# Patient Record
Sex: FEMALE | Race: WHITE | Employment: FULL TIME | URBAN - METROPOLITAN AREA
[De-identification: names, ages, dates, MRNs, and addresses within clinical notes are randomized per-mention and may not be internally consistent; named-entity substitution may affect disease eponyms.]

---

## 2017-04-15 ENCOUNTER — HOSPITAL ENCOUNTER (EMERGENCY)
Age: 62
Discharge: HOME | End: 2017-04-15
Attending: EMERGENCY MEDICINE
Payer: SELF-PAY

## 2017-04-15 ENCOUNTER — APPOINTMENT (OUTPATIENT)
Dept: GENERAL RADIOLOGY | Age: 62
End: 2017-04-15
Attending: EMERGENCY MEDICINE
Payer: SELF-PAY

## 2017-04-15 VITALS
HEART RATE: 102 BPM | WEIGHT: 178 LBS | BODY MASS INDEX: 29.66 KG/M2 | HEIGHT: 65 IN | SYSTOLIC BLOOD PRESSURE: 106 MMHG | OXYGEN SATURATION: 95 % | RESPIRATION RATE: 18 BRPM | DIASTOLIC BLOOD PRESSURE: 56 MMHG | TEMPERATURE: 99.1 F

## 2017-04-15 DIAGNOSIS — J20.9 ACUTE BRONCHITIS, UNSPECIFIED ORGANISM: Primary | ICD-10-CM

## 2017-04-15 LAB
ALBUMIN SERPL BCP-MCNC: 3.9 G/DL (ref 3.2–4.6)
ALBUMIN/GLOB SERPL: 0.9 {RATIO} (ref 1.2–3.5)
ALP SERPL-CCNC: 99 U/L (ref 50–136)
ALT SERPL-CCNC: 49 U/L (ref 12–65)
ANION GAP BLD CALC-SCNC: 9 MMOL/L (ref 7–16)
AST SERPL W P-5'-P-CCNC: 38 U/L (ref 15–37)
ATRIAL RATE: 106 BPM
BASOPHILS # BLD AUTO: 0.1 K/UL (ref 0–0.2)
BASOPHILS # BLD: 1 % (ref 0–2)
BILIRUB SERPL-MCNC: 0.3 MG/DL (ref 0.2–1.1)
BUN SERPL-MCNC: 14 MG/DL (ref 8–23)
CALCIUM SERPL-MCNC: 9.1 MG/DL (ref 8.3–10.4)
CALCULATED P AXIS, ECG09: 64 DEGREES
CALCULATED R AXIS, ECG10: 66 DEGREES
CALCULATED T AXIS, ECG11: 28 DEGREES
CHLORIDE SERPL-SCNC: 106 MMOL/L (ref 98–107)
CO2 SERPL-SCNC: 26 MMOL/L (ref 21–32)
CREAT SERPL-MCNC: 1.2 MG/DL (ref 0.6–1)
DIAGNOSIS, 93000: NORMAL
DIFFERENTIAL METHOD BLD: NORMAL
EOSINOPHIL # BLD: 0.3 K/UL (ref 0–0.8)
EOSINOPHIL NFR BLD: 5 % (ref 0.5–7.8)
ERYTHROCYTE [DISTWIDTH] IN BLOOD BY AUTOMATED COUNT: 13.7 % (ref 11.9–14.6)
GLOBULIN SER CALC-MCNC: 4.3 G/DL (ref 2.3–3.5)
GLUCOSE SERPL-MCNC: 126 MG/DL (ref 65–100)
HCT VFR BLD AUTO: 43.4 % (ref 35.8–46.3)
HGB BLD-MCNC: 15.2 G/DL (ref 11.7–15.4)
IMM GRANULOCYTES # BLD: 0 K/UL (ref 0–0.5)
IMM GRANULOCYTES NFR BLD AUTO: 0.2 % (ref 0–5)
LYMPHOCYTES # BLD AUTO: 36 % (ref 13–44)
LYMPHOCYTES # BLD: 2 K/UL (ref 0.5–4.6)
MCH RBC QN AUTO: 32.5 PG (ref 26.1–32.9)
MCHC RBC AUTO-ENTMCNC: 35 G/DL (ref 31.4–35)
MCV RBC AUTO: 92.9 FL (ref 79.6–97.8)
MONOCYTES # BLD: 0.4 K/UL (ref 0.1–1.3)
MONOCYTES NFR BLD AUTO: 8 % (ref 4–12)
NEUTS SEG # BLD: 2.7 K/UL (ref 1.7–8.2)
NEUTS SEG NFR BLD AUTO: 50 % (ref 43–78)
P-R INTERVAL, ECG05: 188 MS
PLATELET # BLD AUTO: 233 K/UL (ref 150–450)
PMV BLD AUTO: 10.8 FL (ref 10.8–14.1)
POTASSIUM SERPL-SCNC: 3.7 MMOL/L (ref 3.5–5.1)
PROT SERPL-MCNC: 8.2 G/DL (ref 6.3–8.2)
Q-T INTERVAL, ECG07: 304 MS
QRS DURATION, ECG06: 68 MS
QTC CALCULATION (BEZET), ECG08: 403 MS
RBC # BLD AUTO: 4.67 M/UL (ref 4.05–5.25)
SODIUM SERPL-SCNC: 141 MMOL/L (ref 136–145)
TROPONIN I SERPL-MCNC: <0.02 NG/ML (ref 0.02–0.05)
VENTRICULAR RATE, ECG03: 106 BPM
WBC # BLD AUTO: 5.5 K/UL (ref 4.3–11.1)

## 2017-04-15 PROCEDURE — 93005 ELECTROCARDIOGRAM TRACING: CPT | Performed by: EMERGENCY MEDICINE

## 2017-04-15 PROCEDURE — 85025 COMPLETE CBC W/AUTO DIFF WBC: CPT | Performed by: EMERGENCY MEDICINE

## 2017-04-15 PROCEDURE — 74011000250 HC RX REV CODE- 250: Performed by: EMERGENCY MEDICINE

## 2017-04-15 PROCEDURE — 80053 COMPREHEN METABOLIC PANEL: CPT | Performed by: EMERGENCY MEDICINE

## 2017-04-15 PROCEDURE — 99284 EMERGENCY DEPT VISIT MOD MDM: CPT | Performed by: EMERGENCY MEDICINE

## 2017-04-15 PROCEDURE — 84484 ASSAY OF TROPONIN QUANT: CPT | Performed by: EMERGENCY MEDICINE

## 2017-04-15 PROCEDURE — 94640 AIRWAY INHALATION TREATMENT: CPT

## 2017-04-15 PROCEDURE — 71020 XR CHEST PA LAT: CPT

## 2017-04-15 RX ORDER — AZITHROMYCIN 250 MG/1
TABLET, FILM COATED ORAL
Qty: 6 TAB | Refills: 0 | Status: SHIPPED | OUTPATIENT
Start: 2017-04-15 | End: 2017-05-01

## 2017-04-15 RX ORDER — ALBUTEROL SULFATE 90 UG/1
2 AEROSOL, METERED RESPIRATORY (INHALATION)
Qty: 1 INHALER | Refills: 0 | Status: SHIPPED | OUTPATIENT
Start: 2017-04-15 | End: 2017-06-01

## 2017-04-15 RX ORDER — BENZONATATE 200 MG/1
200 CAPSULE ORAL
Qty: 21 CAP | Refills: 0 | Status: SHIPPED | OUTPATIENT
Start: 2017-04-15 | End: 2017-04-22

## 2017-04-15 RX ORDER — IPRATROPIUM BROMIDE AND ALBUTEROL SULFATE 2.5; .5 MG/3ML; MG/3ML
3 SOLUTION RESPIRATORY (INHALATION)
Status: COMPLETED | OUTPATIENT
Start: 2017-04-15 | End: 2017-04-15

## 2017-04-15 RX ADMIN — IPRATROPIUM BROMIDE AND ALBUTEROL SULFATE 3 ML: 2.5; .5 SOLUTION RESPIRATORY (INHALATION) at 04:22

## 2017-04-15 NOTE — DISCHARGE INSTRUCTIONS
Use inhlaer 2 puffs every 6 hours  1,200mg mucinex DM every 12 hours for a week. Drink plenty of fluids           Bronchitis: Care Instructions  Your Care Instructions    Bronchitis is inflammation of the bronchial tubes, which carry air to the lungs. The tubes swell and produce mucus, or phlegm. The mucus and inflamed bronchial tubes make you cough. You may have trouble breathing. Most cases of bronchitis are caused by viruses like those that cause colds. Antibiotics usually do not help and they may be harmful. Bronchitis usually develops rapidly and lasts about 2 to 3 weeks in otherwise healthy people. Follow-up care is a key part of your treatment and safety. Be sure to make and go to all appointments, and call your doctor if you are having problems. It's also a good idea to know your test results and keep a list of the medicines you take. How can you care for yourself at home? · Take all medicines exactly as prescribed. Call your doctor if you think you are having a problem with your medicine. · Get some extra rest.  · Take an over-the-counter pain medicine, such as acetaminophen (Tylenol), ibuprofen (Advil, Motrin), or naproxen (Aleve) to reduce fever and relieve body aches. Read and follow all instructions on the label. · Do not take two or more pain medicines at the same time unless the doctor told you to. Many pain medicines have acetaminophen, which is Tylenol. Too much acetaminophen (Tylenol) can be harmful. · Take an over-the-counter cough medicine that contains dextromethorphan to help quiet a dry, hacking cough so that you can sleep. Avoid cough medicines that have more than one active ingredient. Read and follow all instructions on the label. · Breathe moist air from a humidifier, hot shower, or sink filled with hot water. The heat and moisture will thin mucus so you can cough it out. · Do not smoke. Smoking can make bronchitis worse.  If you need help quitting, talk to your doctor about stop-smoking programs and medicines. These can increase your chances of quitting for good. When should you call for help? Call 911 anytime you think you may need emergency care. For example, call if:  · You have severe trouble breathing. Call your doctor now or seek immediate medical care if:  · You have new or worse trouble breathing. · You cough up dark brown or bloody mucus (sputum). · You have a new or higher fever. · You have a new rash. Watch closely for changes in your health, and be sure to contact your doctor if:  · You cough more deeply or more often, especially if you notice more mucus or a change in the color of your mucus. · You are not getting better as expected. Where can you learn more? Go to http://maribeth-juan.info/. Enter H333 in the search box to learn more about \"Bronchitis: Care Instructions. \"  Current as of: May 23, 2016  Content Version: 11.2  © 8727-7877 [employer]. Care instructions adapted under license by [employer] (which disclaims liability or warranty for this information). If you have questions about a medical condition or this instruction, always ask your healthcare professional. [address] any warranty or liability for your use of this information.

## 2017-04-15 NOTE — ED PROVIDER NOTES
HPI Comments: Condition presents with 3 days of upper respiratory infection symptoms. A coworker who sits next to hers been coughing for about 2 weeks. Patient now presents with wheezing and coughing productive of green phlegm. The highest temperature she has encountered is 99.7 orally at home. The patient denies generalized body aches and pains. Patient is a 65 y.o. female presenting with shortness of breath. The history is provided by the patient. Shortness of Breath   This is a new problem. The current episode started more than 2 days ago. The problem has not changed since onset. Associated symptoms include cough and wheezing. Pertinent negatives include no fever, no headaches, no rhinorrhea, no sore throat, no chest pain, no vomiting, no abdominal pain and no rash. Past Medical History:   Diagnosis Date    Anxiety     ASHD (arteriosclerotic heart disease)     CT Calcium = 44    GERD (gastroesophageal reflux disease)     egd , DR Jason Quarles    HLD (hyperlipidemia)     ascvd = 3.8 in 2016    Menopause     Prediabetes     Sleep apnea     on cpap       Past Surgical History:   Procedure Laterality Date    HX ADENOIDECTOMY      HX  SECTION      HX TONSILLECTOMY           Family History:   Problem Relation Age of Onset    Hypertension Father        Social History     Social History    Marital status:      Spouse name: N/A    Number of children: N/A    Years of education: N/A     Occupational History    Not on file. Social History Main Topics    Smoking status: Former Smoker    Smokeless tobacco: Never Used    Alcohol use 0.0 oz/week     0 Standard drinks or equivalent per week    Drug use: Not on file    Sexual activity: Not on file     Other Topics Concern    Sleep Concern Yes     Social History Narrative         ALLERGIES: Sulfa dyne    Review of Systems   Constitutional: Negative for chills, fatigue and fever. HENT: Negative for rhinorrhea and sore throat. Eyes: Negative for discharge and redness. Respiratory: Positive for cough, shortness of breath and wheezing. Cardiovascular: Negative for chest pain. Gastrointestinal: Negative for abdominal pain, nausea and vomiting. Musculoskeletal: Negative for arthralgias, back pain and myalgias. Skin: Negative for rash. Neurological: Negative for dizziness and headaches. All other systems reviewed and are negative. Vitals:    04/15/17 0322 04/15/17 0423   BP: 141/80    Pulse: (!) 114    Resp: 22    Temp: 99.1 °F (37.3 °C)    SpO2: 95% 95%   Weight: 80.7 kg (178 lb)    Height: 5' 5\" (1.651 m)             Physical Exam   Constitutional: She is oriented to person, place, and time. She appears well-developed and well-nourished. No distress. HENT:   Head: Normocephalic and atraumatic. Mouth/Throat: Oropharynx is clear and moist. No oropharyngeal exudate. Eyes: Conjunctivae and EOM are normal. Pupils are equal, round, and reactive to light. Right eye exhibits no discharge. Left eye exhibits no discharge. No scleral icterus. Neck: Normal range of motion. Neck supple. Cardiovascular: Normal rate, regular rhythm and normal heart sounds. Exam reveals no gallop. No murmur heard. Pulmonary/Chest: Effort normal. No respiratory distress. She has decreased breath sounds in the right upper field, the right middle field and the right lower field. She has wheezes in the left upper field, the left middle field and the left lower field. She has no rales. Ventilation better, wheezing his vanished, following a single DuoNeb nebulizer treatment   Musculoskeletal: Normal range of motion. She exhibits no edema. Neurological: She is alert and oriented to person, place, and time. She exhibits normal muscle tone. cni 2-12 grossly   Skin: Skin is warm and dry. She is not diaphoretic. Psychiatric: She has a normal mood and affect. Her behavior is normal.   Nursing note and vitals reviewed.        MDM  Number of Diagnoses or Management Options  Acute bronchitis, unspecified organism:   Diagnosis management comments: Medical decision making note:  Cough with wheezing, transfer 94-95 on room air  Chest x-ray is negative,  Better after treatment,  Treat for bronchitis    This concludes the \"medical decision making note\" part of this emergency department visit note.       ED Course       Procedures

## 2017-04-15 NOTE — ED TRIAGE NOTES
Patient states cough, fever, shortness of breath. States has had a cold for about 3 days with a productive cough. States fever last night with increasing shortness of breath today.

## 2017-05-24 ENCOUNTER — HOSPITAL ENCOUNTER (OUTPATIENT)
Dept: CT IMAGING | Age: 62
Discharge: HOME | End: 2017-05-24
Attending: FAMILY MEDICINE
Payer: COMMERCIAL

## 2017-05-24 DIAGNOSIS — R42 DIZZINESS: ICD-10-CM

## 2017-05-24 PROCEDURE — 70450 CT HEAD/BRAIN W/O DYE: CPT

## 2018-01-11 PROBLEM — E55.9 VITAMIN D DEFICIENCY: Status: ACTIVE | Noted: 2018-01-11

## 2018-04-19 PROBLEM — R07.9 CHEST PAIN AT REST: Status: ACTIVE | Noted: 2018-04-19

## 2018-04-19 PROBLEM — I10 ESSENTIAL HYPERTENSION WITH GOAL BLOOD PRESSURE LESS THAN 130/85: Status: ACTIVE | Noted: 2018-04-19

## 2018-04-23 ENCOUNTER — HOSPITAL ENCOUNTER (OUTPATIENT)
Dept: NON INVASIVE DIAGNOSTICS | Age: 63
Discharge: HOME | End: 2018-04-23
Attending: INTERNAL MEDICINE
Payer: COMMERCIAL

## 2018-04-23 DIAGNOSIS — R07.9 CHEST PAIN AT REST: ICD-10-CM

## 2018-04-23 PROCEDURE — 93306 TTE W/DOPPLER COMPLETE: CPT

## 2018-04-23 PROCEDURE — 74011000250 HC RX REV CODE- 250: Performed by: INTERNAL MEDICINE

## 2018-04-23 PROCEDURE — C8930 TTE W OR W/O CONTR, CONT ECG: HCPCS

## 2018-04-23 PROCEDURE — 74011250636 HC RX REV CODE- 250/636: Performed by: INTERNAL MEDICINE

## 2018-04-23 RX ADMIN — PERFLUTREN 1 ML: 6.52 INJECTION, SUSPENSION INTRAVENOUS at 14:38

## 2018-05-10 PROBLEM — K21.9 GASTROESOPHAGEAL REFLUX DISEASE: Status: ACTIVE | Noted: 2018-05-10

## 2018-10-08 ENCOUNTER — HOSPITAL ENCOUNTER (OUTPATIENT)
Dept: GENERAL RADIOLOGY | Age: 63
Discharge: HOME | End: 2018-10-08
Payer: COMMERCIAL

## 2018-10-08 DIAGNOSIS — R07.89 OTHER CHEST PAIN: ICD-10-CM

## 2018-10-08 PROCEDURE — 71046 X-RAY EXAM CHEST 2 VIEWS: CPT

## 2018-10-09 ENCOUNTER — HOSPITAL ENCOUNTER (OUTPATIENT)
Dept: LAB | Age: 63
Discharge: HOME | End: 2018-10-09

## 2018-10-09 PROCEDURE — 88312 SPECIAL STAINS GROUP 1: CPT

## 2018-10-09 PROCEDURE — 88305 TISSUE EXAM BY PATHOLOGIST: CPT

## 2018-10-11 PROBLEM — G47.00 PERSISTENT DISORDER OF INITIATING OR MAINTAINING SLEEP: Status: ACTIVE | Noted: 2018-10-11

## 2019-01-08 ENCOUNTER — HOSPITAL ENCOUNTER (OUTPATIENT)
Dept: GENERAL RADIOLOGY | Age: 64
Discharge: HOME | End: 2019-01-08
Payer: COMMERCIAL

## 2019-01-08 DIAGNOSIS — M25.572 ACUTE LEFT ANKLE PAIN: ICD-10-CM

## 2019-01-08 PROCEDURE — 73610 X-RAY EXAM OF ANKLE: CPT

## 2019-03-22 PROBLEM — R35.0 URINARY FREQUENCY: Status: ACTIVE | Noted: 2019-03-22

## 2019-03-22 PROBLEM — N30.10 INTERSTITIAL CYSTITIS: Status: ACTIVE | Noted: 2019-03-22

## 2019-03-22 PROBLEM — R31.29 MICROSCOPIC HEMATURIA: Status: ACTIVE | Noted: 2019-03-22

## 2019-04-02 ENCOUNTER — HOSPITAL ENCOUNTER (OUTPATIENT)
Dept: ULTRASOUND IMAGING | Age: 64
Discharge: HOME | End: 2019-04-02
Attending: NURSE PRACTITIONER
Payer: COMMERCIAL

## 2019-04-02 DIAGNOSIS — R10.9 FLANK PAIN: ICD-10-CM

## 2019-04-02 DIAGNOSIS — R31.29 MICROSCOPIC HEMATURIA: ICD-10-CM

## 2019-04-02 PROCEDURE — 76770 US EXAM ABDO BACK WALL COMP: CPT

## 2019-05-15 ENCOUNTER — HOSPITAL ENCOUNTER (OUTPATIENT)
Dept: LAB | Age: 64
Discharge: HOME | End: 2019-05-15

## 2019-05-15 PROCEDURE — 88305 TISSUE EXAM BY PATHOLOGIST: CPT

## 2019-11-27 ENCOUNTER — APPOINTMENT (OUTPATIENT)
Dept: GENERAL RADIOLOGY | Age: 64
End: 2019-11-27
Attending: EMERGENCY MEDICINE
Payer: COMMERCIAL

## 2019-11-27 ENCOUNTER — HOSPITAL ENCOUNTER (EMERGENCY)
Age: 64
Discharge: HOME | End: 2019-11-27
Attending: EMERGENCY MEDICINE
Payer: COMMERCIAL

## 2019-11-27 VITALS
OXYGEN SATURATION: 98 % | SYSTOLIC BLOOD PRESSURE: 151 MMHG | DIASTOLIC BLOOD PRESSURE: 81 MMHG | TEMPERATURE: 97.9 F | HEART RATE: 74 BPM

## 2019-11-27 DIAGNOSIS — S39.012A STRAIN OF LUMBAR REGION, INITIAL ENCOUNTER: Primary | ICD-10-CM

## 2019-11-27 DIAGNOSIS — M47.812 CERVICAL SPONDYLOSIS: ICD-10-CM

## 2019-11-27 DIAGNOSIS — M54.12 CERVICAL RADICULOPATHY: ICD-10-CM

## 2019-11-27 LAB
BACTERIA URNS QL MICRO: 0 /HPF
CASTS URNS QL MICRO: NORMAL /LPF
EPI CELLS #/AREA URNS HPF: NORMAL /HPF
RBC #/AREA URNS HPF: NORMAL /HPF
WBC URNS QL MICRO: NORMAL /HPF

## 2019-11-27 PROCEDURE — 72100 X-RAY EXAM L-S SPINE 2/3 VWS: CPT

## 2019-11-27 PROCEDURE — 72040 X-RAY EXAM NECK SPINE 2-3 VW: CPT

## 2019-11-27 PROCEDURE — 81015 MICROSCOPIC EXAM OF URINE: CPT

## 2019-11-27 PROCEDURE — 99283 EMERGENCY DEPT VISIT LOW MDM: CPT | Performed by: EMERGENCY MEDICINE

## 2019-11-27 PROCEDURE — 81003 URINALYSIS AUTO W/O SCOPE: CPT | Performed by: EMERGENCY MEDICINE

## 2019-11-27 RX ORDER — MELOXICAM 7.5 MG/1
7.5 TABLET ORAL DAILY
Qty: 30 TAB | Refills: 0 | Status: SHIPPED | OUTPATIENT
Start: 2019-11-27 | End: 2020-01-07

## 2019-11-27 RX ORDER — METAXALONE 800 MG/1
800 TABLET ORAL 3 TIMES DAILY
Qty: 20 TAB | Refills: 0 | Status: SHIPPED | OUTPATIENT
Start: 2019-11-27 | End: 2020-01-07

## 2019-11-27 NOTE — ED PROVIDER NOTES
79-year-old female with a history of chronic neck and back pain presents with increasing back pain after turning sideways yesterday at her desk. She reports developing sudden onset of dull aching pain and then had difficulty standing up afterwards. She states she felt like she could not stand up straight. Pain continued throughout the night last night. She has also had tingling in the ulnar distribution of her arms and hands and in her legs for the past month. This usually only occurs during the night and upon waking and improves throughout the day. She has had similar symptoms in the past and actually had nerve conduction studies at the [facility] in [place] several years ago. She denies ever having an MRI. She had increased tingling this morning and took Advil. Denies any trauma, fever, focal weakness. She does report some generalized weakness. She is concerned because she is going out of town to [place] next week and plans on doing a lot of walking. Back Pain    Associated symptoms include numbness and weakness. Pertinent negatives include no chest pain, no fever, no headaches and no abdominal pain.         Past Medical History:   Diagnosis Date    Anxiety     ASHD (arteriosclerotic heart disease)     CT Calcium = 44    GERD (gastroesophageal reflux disease)     egd , DR [facility]    HLD (hyperlipidemia)     ascvd = 3.8 in 2016, 5.7 in 2017    Interstitial cystitis     h/o interstitial cystitis    Menopause     Prediabetes     Sleep apnea     on cpap    Vitamin D deficiency 2018       Past Surgical History:   Procedure Laterality Date    HX ADENOIDECTOMY      HX  SECTION      HX TONSILLECTOMY           Family History:   Problem Relation Age of Onset    No Known Problems Mother     Hypertension Father        Social History     Socioeconomic History    Marital status:      Spouse name: Not on file    Number of children: Not on file    Years of education: Not on file    Highest education level: Not on file   Occupational History    Not on file   Social Needs    Financial resource strain: Not on file    Food insecurity:     Worry: Not on file     Inability: Not on file    Transportation needs:     Medical: Not on file     Non-medical: Not on file   Tobacco Use    Smoking status: Former Smoker    Smokeless tobacco: Never Used   Substance and Sexual Activity    Alcohol use: Yes     Alcohol/week: 0.0 standard drinks    Drug use: Not on file    Sexual activity: Not on file   Lifestyle    Physical activity:     Days per week: Not on file     Minutes per session: Not on file    Stress: Not on file   Relationships    Social connections:     Talks on phone: Not on file     Gets together: Not on file     Attends Shinto service: Not on file     Active member of club or organization: Not on file     Attends meetings of clubs or organizations: Not on file     Relationship status: Not on file    Intimate partner violence:     Fear of current or ex partner: Not on file     Emotionally abused: Not on file     Physically abused: Not on file     Forced sexual activity: Not on file   Other Topics Concern     Service Not Asked    Blood Transfusions Not Asked    Caffeine Concern Not Asked    Occupational Exposure Not Asked    Hobby Hazards Not Asked    Sleep Concern Yes    Stress Concern Not Asked    Weight Concern Not Asked    Special Diet Not Asked    Back Care Not Asked    Exercise Not Asked    Bike Helmet Not Asked   [address] Not Asked    Self-Exams Not Asked   Social History Narrative    Not on file         ALLERGIES: Sulfa dyne    Review of Systems   Constitutional: Positive for fatigue. Negative for chills and fever. HENT: Negative for hearing loss. Eyes: Negative for visual disturbance. Respiratory: Negative for cough and shortness of breath. Cardiovascular: Negative for chest pain and palpitations. Gastrointestinal: Negative for abdominal pain, diarrhea, nausea and vomiting. Musculoskeletal: Positive for back pain and neck pain. Skin: Negative for rash. Neurological: Positive for weakness and numbness. Negative for headaches. Psychiatric/Behavioral: Negative for confusion. Vitals:    11/27/19 1011   BP: 151/81   Pulse: 74   Temp: 97.9 °F (36.6 °C)   SpO2: 98%            Physical Exam  Vitals signs and nursing note reviewed. Constitutional:       Appearance: She is well-developed. HENT:      Head: Normocephalic and atraumatic. Eyes:      Pupils: Pupils are equal, round, and reactive to light. Neck:      Musculoskeletal: Normal range of motion and neck supple. Normal range of motion. Spinous process tenderness and muscular tenderness present. No edema, erythema, neck rigidity, crepitus, injury or torticollis. Cardiovascular:      Rate and Rhythm: Regular rhythm. Heart sounds: Normal heart sounds. Pulmonary:      Effort: Pulmonary effort is normal.      Breath sounds: Normal breath sounds. Abdominal:      Palpations: Abdomen is soft. Tenderness: There is no tenderness. Musculoskeletal: Normal range of motion. Lumbar back: She exhibits tenderness and pain. She exhibits normal range of motion and no swelling. Back:    Skin:     General: Skin is warm and dry. Neurological:      Mental Status: She is alert. Cranial Nerves: Cranial nerves are intact. Sensory: Sensation is intact. No sensory deficit. Motor: Motor function is intact. Comments: Normal radial, median, ulnar sensory distribution without weakness   Psychiatric:         Behavior: Behavior normal.          MDM  Number of Diagnoses or Management Options  Diagnosis management comments: Parts of this document were created using dragon voice recognition software. The chart has been reviewed but errors may still be present.     Advise follow-up with primary care physician for possible referral to physical therapy and MRI. Will prescribe Mobic and Skelaxin. I discussed the results of all labs, procedures, radiographs, and treatments with the patient and available family. Treatment plan is agreed upon and the patient is ready for discharge. Questions about treatment in the ED and differential diagnosis of presenting condition were answered. Patient was given verbal discharge instructions including, but not limited to, importance of returning to the emergency department for any concern of worsening or continued symptoms. Instructions were given to follow up with a primary care provider or specialist within 1-2 days. Adverse effects of medications, if prescribed, were discussed and patient was advised to refrain from significant physical activity until followed up by primary care physician and to not drive or operate heavy machinery after taking any sedating substances.            Amount and/or Complexity of Data Reviewed  Clinical lab tests: ordered and reviewed  Tests in the radiology section of CPT®: ordered and reviewed           Procedures

## 2019-11-27 NOTE — ED NOTES
I have reviewed discharge instructions with the patient. The patient verbalized understanding. Patient left ED via Discharge Method: ambulatory to Home with self    Opportunity for questions and clarification provided. Patient given 2 scripts. No e-sign      To continue your aftercare when you leave the hospital, you may receive an automated call from our care team to check in on how you are doing. This is a free service and part of our promise to provide the best care and service to meet your aftercare needs.  If you have questions, or wish to unsubscribe from this service please call [phone]. Thank you for Choosing our [facility] Emergency Department.

## 2019-11-27 NOTE — DISCHARGE INSTRUCTIONS
Follow-up with your doctor for possible MRI and referral to physical therapy. Try massage, gentle exercises, and possible chiropractor. Return for worsening or concerning symptoms.

## 2019-11-27 NOTE — ED TRIAGE NOTES
Patient reports increased back and neck pain. States that she turned to put something into a filing cabinet and was unable to stand up at that time.

## 2019-12-03 NOTE — PROGRESS NOTES
Patient states that she is going out of town and will call back on 12/9/2019 to schedule ER F/U with Dr. Elmo Sylvester.

## 2020-01-10 ENCOUNTER — HOSPITAL ENCOUNTER (OUTPATIENT)
Dept: CT IMAGING | Age: 65
Discharge: HOME | End: 2020-01-10
Attending: NURSE PRACTITIONER
Payer: COMMERCIAL

## 2020-01-10 DIAGNOSIS — N30.10 INTERSTITIAL CYSTITIS: ICD-10-CM

## 2020-01-10 DIAGNOSIS — R35.0 URINARY FREQUENCY: ICD-10-CM

## 2020-01-10 DIAGNOSIS — R31.0 GROSS HEMATURIA: ICD-10-CM

## 2020-01-10 DIAGNOSIS — R10.9 FLANK PAIN: ICD-10-CM

## 2020-01-10 DIAGNOSIS — R35.1 NOCTURIA: ICD-10-CM

## 2020-01-10 DIAGNOSIS — R31.29 MICROSCOPIC HEMATURIA: ICD-10-CM

## 2020-01-10 LAB — CREAT BLD-MCNC: 1 MG/DL (ref 0.8–1.5)

## 2020-01-10 PROCEDURE — 82565 ASSAY OF CREATININE: CPT

## 2020-01-10 PROCEDURE — 74178 CT ABD&PLV WO CNTR FLWD CNTR: CPT

## 2020-01-10 PROCEDURE — 74011636320 HC RX REV CODE- 636/320

## 2020-01-10 RX ORDER — SODIUM CHLORIDE 0.9 % (FLUSH) 0.9 %
10 SYRINGE (ML) INJECTION
Status: ACTIVE | OUTPATIENT
Start: 2020-01-10 | End: 2020-01-10

## 2020-01-10 NOTE — PROGRESS NOTES
CT showed no obvious etiology for hematuria. Specifically NO passing renal stones and NO cancers. There was a renal stone noted in the left kidney. This is causing no obstruction or problem. This does not appear to be the source of the hematuria. Follow up for cystoscopy.

## 2020-09-02 PROBLEM — K21.9 GASTROESOPHAGEAL REFLUX DISEASE: Status: RESOLVED | Noted: 2018-05-10 | Resolved: 2020-09-02

## 2020-09-02 PROBLEM — I25.10 CORONARY ARTERY DISEASE DUE TO CALCIFIED CORONARY LESION: Status: ACTIVE | Noted: 2020-09-02

## 2020-09-02 PROBLEM — R35.0 URINARY FREQUENCY: Status: RESOLVED | Noted: 2019-03-22 | Resolved: 2020-09-02

## 2020-09-02 PROBLEM — I25.84 CORONARY ARTERY DISEASE DUE TO CALCIFIED CORONARY LESION: Status: ACTIVE | Noted: 2020-09-02

## 2020-09-02 PROBLEM — R07.9 CHEST PAIN AT REST: Status: RESOLVED | Noted: 2018-04-19 | Resolved: 2020-09-02

## 2020-11-02 PROBLEM — R20.2 PARESTHESIA OF UPPER AND LOWER EXTREMITIES OF BOTH SIDES: Status: ACTIVE | Noted: 2020-11-02

## 2020-12-02 LAB — MAMMOGRAPHY, EXTERNAL: NORMAL

## 2020-12-08 PROBLEM — G56.03 BILATERAL CARPAL TUNNEL SYNDROME: Status: ACTIVE | Noted: 2020-12-08

## 2021-02-14 ENCOUNTER — APPOINTMENT (OUTPATIENT)
Dept: GENERAL RADIOLOGY | Age: 66
End: 2021-02-14
Attending: EMERGENCY MEDICINE
Payer: COMMERCIAL

## 2021-02-14 ENCOUNTER — HOSPITAL ENCOUNTER (EMERGENCY)
Age: 66
Discharge: HOME | End: 2021-02-14
Attending: EMERGENCY MEDICINE
Payer: COMMERCIAL

## 2021-02-14 VITALS
DIASTOLIC BLOOD PRESSURE: 72 MMHG | SYSTOLIC BLOOD PRESSURE: 110 MMHG | TEMPERATURE: 98.1 F | BODY MASS INDEX: 31.32 KG/M2 | HEART RATE: 73 BPM | WEIGHT: 188 LBS | OXYGEN SATURATION: 95 % | RESPIRATION RATE: 22 BRPM | HEIGHT: 65 IN

## 2021-02-14 DIAGNOSIS — R07.9 CHEST PAIN, UNSPECIFIED TYPE: Primary | ICD-10-CM

## 2021-02-14 LAB
ALBUMIN SERPL-MCNC: 3.7 G/DL (ref 3.2–4.6)
ALBUMIN/GLOB SERPL: 0.9 {RATIO} (ref 1.2–3.5)
ALP SERPL-CCNC: 89 U/L (ref 50–136)
ALT SERPL-CCNC: 29 U/L (ref 12–65)
ANION GAP SERPL CALC-SCNC: 6 MMOL/L (ref 7–16)
AST SERPL-CCNC: 24 U/L (ref 15–37)
ATRIAL RATE: 81 BPM
BASOPHILS # BLD: 0.1 K/UL (ref 0–0.2)
BASOPHILS NFR BLD: 1 % (ref 0–2)
BILIRUB SERPL-MCNC: 0.3 MG/DL (ref 0.2–1.1)
BUN SERPL-MCNC: 15 MG/DL (ref 8–23)
CALCIUM SERPL-MCNC: 9.3 MG/DL (ref 8.3–10.4)
CALCULATED P AXIS, ECG09: 42 DEGREES
CALCULATED R AXIS, ECG10: 88 DEGREES
CALCULATED T AXIS, ECG11: -164 DEGREES
CHLORIDE SERPL-SCNC: 109 MMOL/L (ref 98–107)
CO2 SERPL-SCNC: 27 MMOL/L (ref 21–32)
CREAT SERPL-MCNC: 1.31 MG/DL (ref 0.6–1)
DIAGNOSIS, 93000: NORMAL
DIFFERENTIAL METHOD BLD: NORMAL
EOSINOPHIL # BLD: 0.4 K/UL (ref 0–0.8)
EOSINOPHIL NFR BLD: 5 % (ref 0.5–7.8)
ERYTHROCYTE [DISTWIDTH] IN BLOOD BY AUTOMATED COUNT: 12.9 % (ref 11.9–14.6)
GLOBULIN SER CALC-MCNC: 3.9 G/DL (ref 2.3–3.5)
GLUCOSE SERPL-MCNC: 121 MG/DL (ref 65–100)
HCT VFR BLD AUTO: 41 % (ref 35.8–46.3)
HGB BLD-MCNC: 13.7 G/DL (ref 11.7–15.4)
IMM GRANULOCYTES # BLD AUTO: 0 K/UL (ref 0–0.5)
IMM GRANULOCYTES NFR BLD AUTO: 0 % (ref 0–5)
LYMPHOCYTES # BLD: 2.8 K/UL (ref 0.5–4.6)
LYMPHOCYTES NFR BLD: 35 % (ref 13–44)
MCH RBC QN AUTO: 32.2 PG (ref 26.1–32.9)
MCHC RBC AUTO-ENTMCNC: 33.4 G/DL (ref 31.4–35)
MCV RBC AUTO: 96.2 FL (ref 79.6–97.8)
MONOCYTES # BLD: 0.5 K/UL (ref 0.1–1.3)
MONOCYTES NFR BLD: 6 % (ref 4–12)
NEUTS SEG # BLD: 4.2 K/UL (ref 1.7–8.2)
NEUTS SEG NFR BLD: 53 % (ref 43–78)
NRBC # BLD: 0 K/UL (ref 0–0.2)
P-R INTERVAL, ECG05: 204 MS
PLATELET # BLD AUTO: 254 K/UL (ref 150–450)
PMV BLD AUTO: 10.4 FL (ref 9.4–12.3)
POTASSIUM SERPL-SCNC: 3.6 MMOL/L (ref 3.5–5.1)
PROT SERPL-MCNC: 7.6 G/DL (ref 6.3–8.2)
Q-T INTERVAL, ECG07: 386 MS
QRS DURATION, ECG06: 70 MS
QTC CALCULATION (BEZET), ECG08: 448 MS
RBC # BLD AUTO: 4.26 M/UL (ref 4.05–5.2)
SODIUM SERPL-SCNC: 142 MMOL/L (ref 136–145)
TROPONIN-HIGH SENSITIVITY: 4.5 PG/ML (ref 0–14)
TROPONIN-HIGH SENSITIVITY: 4.9 PG/ML (ref 0–14)
VENTRICULAR RATE, ECG03: 81 BPM
WBC # BLD AUTO: 8 K/UL (ref 4.3–11.1)

## 2021-02-14 PROCEDURE — 84484 ASSAY OF TROPONIN QUANT: CPT

## 2021-02-14 PROCEDURE — 85025 COMPLETE CBC W/AUTO DIFF WBC: CPT

## 2021-02-14 PROCEDURE — 80053 COMPREHEN METABOLIC PANEL: CPT

## 2021-02-14 PROCEDURE — 99284 EMERGENCY DEPT VISIT MOD MDM: CPT

## 2021-02-14 PROCEDURE — 93005 ELECTROCARDIOGRAM TRACING: CPT | Performed by: EMERGENCY MEDICINE

## 2021-02-14 PROCEDURE — 71045 X-RAY EXAM CHEST 1 VIEW: CPT

## 2021-02-14 RX ORDER — METOPROLOL SUCCINATE 25 MG/1
25 TABLET, EXTENDED RELEASE ORAL DAILY
Qty: 30 TAB | Refills: 0 | Status: SHIPPED | OUTPATIENT
Start: 2021-02-14 | End: 2021-03-01

## 2021-02-14 NOTE — ED TRIAGE NOTES
Patient arrives ambulatory to triage with mask in place. Patient reports waking up at night to [facility] thumping\" and then having onset of pain. Reports this has been going on for some time and she has seen cardiology. Reports today patient had episode of \"thumping\" and midsternal chest pain. Took indigestion medication with improvement in pain. Reports continued left arm pain. Reports that cardiology has scheduled bilateral leg ultrasounds on Wednesday.

## 2021-02-14 NOTE — ED PROVIDER NOTES
60year old female with a history of anxiety, GERD, high cholesterol, interstitial cystitis, prediabetes presents with chest pain. She has been having \"thumping\" in her chest intermittently for the past several years. She has had a significant prior cardiac work-up, most recently about 2 years ago. She had more frequent episodes 2 days ago that was followed by few minutes of chest discomfort. She developed pain in her left arm last night, so decided to come to the emergency department. She developed shortness of breath when symptoms occur. No exertional symptoms, diaphoresis, radiation of pain. No new cough, congestion, fever. Chest Pain (Angina)   Associated symptoms include shortness of breath. Pertinent negatives include no abdominal pain, no back pain, no cough, no fever, no headaches, no nausea, no palpitations, no vomiting and no weakness. Past Medical History:   Diagnosis Date    Anxiety     ASHD (arteriosclerotic heart disease)     CT Calcium = 39    Bilateral carpal tunnel syndrome 2020    GERD (gastroesophageal reflux disease)     egd , DR Nida Benavidez    HLD (hyperlipidemia)     ascvd = 3.8 in , 5.7 in 2017    Interstitial cystitis     h/o interstitial cystitis    Menopause     Paresthesia of upper and lower extremities of both sides 2020    Episode 2 weeks, resolved.     Prediabetes     Sleep apnea     on cpap    Vitamin D deficiency 2018       Past Surgical History:   Procedure Laterality Date    HX ADENOIDECTOMY      HX  SECTION      HX TONSILLECTOMY           Family History:   Problem Relation Age of Onset    No Known Problems Mother     Hypertension Father        Social History     Socioeconomic History    Marital status:      Spouse name: Not on file    Number of children: Not on file    Years of education: Not on file    Highest education level: Not on file   Occupational History    Not on file   Social Needs    Financial resource strain: Not on file    Food insecurity     Worry: Not on file     Inability: Not on file    Transportation needs     Medical: Not on file     Non-medical: Not on file   Tobacco Use    Smoking status: Former Smoker     Packs/day: 5.00     Years: 4.00     Pack years: 20.00     Types: Cigarettes    Smokeless tobacco: Never Used   Substance and Sexual Activity    Alcohol use: Yes     Alcohol/week: 0.0 standard drinks    Drug use: Not on file    Sexual activity: Not on file   Lifestyle    Physical activity     Days per week: Not on file     Minutes per session: Not on file    Stress: Not on file   Relationships    Social connections     Talks on phone: Not on file     Gets together: Not on file     Attends Hoahaoism service: Not on file     Active member of club or organization: Not on file     Attends meetings of clubs or organizations: Not on file     Relationship status: Not on file    Intimate partner violence     Fear of current or ex partner: Not on file     Emotionally abused: Not on file     Physically abused: Not on file     Forced sexual activity: Not on file   Other Topics Concern     Service Not Asked    Blood Transfusions Not Asked    Caffeine Concern Not Asked    Occupational Exposure Not Asked    Hobby Hazards Not Asked    Sleep Concern Yes    Stress Concern Not Asked    Weight Concern Not Asked    Special Diet Not Asked    Back Care Not Asked    Exercise Not Asked    Bike Helmet Not Asked   [address] Not Asked    Self-Exams Not Asked   Social History Narrative    Not on file         ALLERGIES: Sulfa dyne    Review of Systems   Constitutional: Negative for chills and fever. HENT: Negative for hearing loss. Eyes: Negative for visual disturbance. Respiratory: Positive for shortness of breath. Negative for cough. Cardiovascular: Positive for chest pain. Negative for palpitations.    Gastrointestinal: Negative for abdominal pain, diarrhea, nausea and vomiting. Musculoskeletal: Negative for back pain. Skin: Negative for rash. Neurological: Negative for weakness and headaches. Psychiatric/Behavioral: Negative for confusion. Vitals:    02/14/21 1444   BP: 137/81   Pulse: 90   Resp: 16   Temp: 98.1 °F (36.7 °C)   SpO2: 96%   Weight: 85.3 kg (188 lb)   Height: 5' 5\" (1.651 m)            Physical Exam  Vitals signs and nursing note reviewed. Constitutional:       Appearance: She is well-developed. HENT:      Head: Normocephalic and atraumatic. Eyes:      Pupils: Pupils are equal, round, and reactive to light. Neck:      Musculoskeletal: Normal range of motion and neck supple. Cardiovascular:      Rate and Rhythm: Regular rhythm. Heart sounds: Normal heart sounds. Pulmonary:      Effort: Pulmonary effort is normal.      Breath sounds: Normal breath sounds. Abdominal:      Palpations: Abdomen is soft. Tenderness: There is no abdominal tenderness. Musculoskeletal: Normal range of motion. Skin:     General: Skin is warm and dry. Neurological:      Mental Status: She is alert. Psychiatric:         Behavior: Behavior normal.          MDM  Number of Diagnoses or Management Options  Diagnosis management comments: Parts of this document were created using dragon voice recognition software. The chart has been reviewed but errors may still be present. I wore appropriate PPE throughout this patient's ED visit. Merry Martinez MD, 3:27 PM    Last seen by cardiology Dr Shana Glass 2/1/21. Had leg pain. Scheduled for BLE venous dopplers. Did not discuss chest pain. 6:09 PM  2 normal trops. Advised cardiology follow up.    6:24 PM  dw Dr Luma Dennison, cardiology, regarding slight new EKG changes and ongoing left arm pain. Recommended toprol and will storey in the office this week    Last echo:    SUMMARY:     -  Left ventricle: Systolic function was normal. Ejection fraction was  estimated in the range of 55 % to 60 %.  There were no regional wall motion  abnormalities.     -  Mitral valve: There was mild to moderate regurgitation.     -  Pericardium: A trivial pericardial effusion was identified. Last stress test:  SUMMARY:  -  Stress results: Duration of exercise was 6 min and 16 sec. Maximal work   rate  was 7 METs. Target heart rate was achieved. There was no chest pain during  stress.     -  ECG conclusions: The stress ECG was normal.     -  Baseline: There were no regional wall motion abnormalities. Estimated left  ventricular ejection fraction was in the range of 55 % to 65 %.    -  Peak stress: There were no regional wall motion abnormalities. Appropriate  hyperdynamic response to stress. There was no evidence of ischemia.     -  Echo image interpretation: There was no echocardiographic evidence for  stress-induced ischemia.     IMPRESSIONS: Normal study after maximal exercise. Left ventricular systolic  function was normal.     Prepared and signed by     Jacques Navarro MD [facility]  Signed 24-Apr-2018 14:14:53     I discussed the results of all labs, procedures, radiographs, and treatments with the patient and available family. Treatment plan is agreed upon and the patient is ready for discharge. Questions about treatment in the ED and differential diagnosis of presenting condition were answered. Patient was given verbal discharge instructions including, but not limited to, importance of returning to the emergency department for any concern of worsening or continued symptoms. Instructions were given to follow up with a primary care provider or specialist within 1-2 days. Adverse effects of medications, if prescribed, were discussed and patient was advised to refrain from significant physical activity until followed up by primary care physician and to not drive or operate heavy machinery after taking any sedating substances.            Amount and/or Complexity of Data Reviewed  Clinical lab tests: ordered and reviewed (Results for orders placed or performed during the hospital encounter of 02/14/21  -CBC WITH AUTOMATED DIFF       Result                      Value             Ref Range           WBC                         8.0               4.3 - 11.1 K*       RBC                         4.26              4.05 - 5.2 M*       HGB                         13.7              11.7 - 15.4 *       HCT                         41.0              35.8 - 46.3 %       MCV                         96.2              79.6 - 97.8 *       MCH                         32.2              26.1 - 32.9 *       MCHC                        33.4              31.4 - 35.0 *       RDW                         12.9              11.9 - 14.6 %       PLATELET                    254               150 - 450 K/*       MPV                         10.4              9.4 - 12.3 FL       ABSOLUTE NRBC               0.00              0.0 - 0.2 K/*       DF                          AUTOMATED                             NEUTROPHILS                 53                43 - 78 %           LYMPHOCYTES                 35                13 - 44 %           MONOCYTES                   6                 4.0 - 12.0 %        EOSINOPHILS                 5                 0.5 - 7.8 %         BASOPHILS                   1                 0.0 - 2.0 %         IMMATURE GRANULOCYTES       0                 0.0 - 5.0 %         ABS. NEUTROPHILS            4.2               1.7 - 8.2 K/*       ABS. LYMPHOCYTES            2.8               0.5 - 4.6 K/*       ABS. MONOCYTES              0.5               0.1 - 1.3 K/*       ABS. EOSINOPHILS            0.4               0.0 - 0.8 K/*       ABS. BASOPHILS              0.1               0.0 - 0.2 K/*       ABS. IMM.  GRANS.            0.0               0.0 - 0.5 K/*  -METABOLIC PANEL, COMPREHENSIVE       Result                      Value             Ref Range           Sodium                      142               136 - 145 mm*       Potassium                   3.6 3.5 - 5.1 mm*       Chloride                    109 (H)           98 - 107 mmo*       CO2                         27                21 - 32 mmol*       Anion gap                   6 (L)             7 - 16 mmol/L       Glucose                     121 (H)           65 - 100 mg/*       BUN                         15                8 - 23 MG/DL        Creatinine                  1.31 (H)          0.6 - 1.0 MG*       GFR est AA                  53 (L)            >60 ml/min/1*       GFR est non-AA              43 (L)            >60 ml/min/1*       Calcium                     9.3               8.3 - 10.4 M*       Bilirubin, total            0.3               0.2 - 1.1 MG*       ALT (SGPT)                  29                12 - 65 U/L         AST (SGOT)                  24                15 - 37 U/L         Alk.  phosphatase            89                50 - 136 U/L        Protein, total              7.6               6.3 - 8.2 g/*       Albumin                     3.7               3.2 - 4.6 g/*       Globulin                    3.9 (H)           2.3 - 3.5 g/*       A-G Ratio                   0.9 (L)           1.2 - 3.5      -TROPONIN-HIGH SENSITIVITY       Result                      Value             Ref Range           Troponin-High Sensitiv*     4.9               0 - 14 pg/mL   -TROPONIN-HIGH SENSITIVITY       Result                      Value             Ref Range           Troponin-High Sensitiv*     4.5               0 - 14 pg/mL   -EKG, 12 LEAD, INITIAL       Result                      Value             Ref Range           Ventricular Rate            81                BPM                 Atrial Rate                 81                BPM                 P-R Interval                204               ms                  QRS Duration                70                ms                  Q-T Interval                386               ms                  QTC Calculation (Bezet)     448               ms Calculated P Axis           42                degrees             Calculated R Axis           88                degrees             Calculated T Axis           -164              degrees             Diagnosis                                                     Normal sinus rhythm   Low voltage QRS   Septal infarct (cited on or before 15-APR-2017)   ST & T wave abnormality, consider inferior ischemia   ST & T wave abnormality, consider anterolateral ischemia   Abnormal ECG   When compared with ECG of 15-APR-2017 03:33,   T wave inversion now evident in Inferior leads   T wave inversion now evident in Anterior leads     )  Tests in the radiology section of CPT®: ordered and reviewed (Xr Chest Port    Result Date: 2/14/2021  EXAMINATION: CHEST RADIOGRAPH 2/14/2021 3:19 PM INDICATION: Chest pain beginning today COMPARISON: September 3, 2019 TECHNIQUE: A single view of the chest was obtained. FINDINGS: Support Devices: None Osseous : No acute abnormality. Cardiomediastinal Silhouette: Normal in size. Lungs: Clear lungs. Normal pulmonary vascularity. Pleura: No pleural fluid or pneumothorax. Upper Abdomen: No abnormality.      No acute abnormality.     )           EKG    Date/Time: 2/14/2021 5:15 PM  Performed by: Lauryn Carcamo MD  Authorized by: Lauryn Carcamo MD     ECG reviewed by ED Physician in the absence of a cardiologist: yes    Previous ECG:     Previous ECG:  Compared to current    Comparison ECG info:  2/1/21    Similarity:  Changes noted  Interpretation:     Interpretation: abnormal    Rate:     ECG rate:  75    ECG rate assessment: normal    Rhythm:     Rhythm: sinus rhythm    Ectopy:     Ectopy: none    T waves:     T waves: inverted      Inverted:  V3, V4, V5, V6, II, III and aVF  Other findings:     Other findings: prolonged qTc interval

## 2021-02-14 NOTE — ED NOTES
I have reviewed discharge instructions with the patient. The patient verbalized understanding. Patient left ED via Discharge Method: ambulatory to Home with self. Opportunity for questions and clarification provided. Patient given 1 scripts. To continue your aftercare when you leave the hospital, you may receive an automated call from our care team to check in on how you are doing. This is a free service and part of our promise to provide the best care and service to meet your aftercare needs.  If you have questions, or wish to unsubscribe from this service please call [phone]. Thank you for Choosing our [facility] Emergency Department.

## 2021-02-15 LAB
ATRIAL RATE: 100 BPM
CALCULATED P AXIS, ECG09: 72 DEGREES
CALCULATED R AXIS, ECG10: -56 DEGREES
CALCULATED T AXIS, ECG11: -8 DEGREES
DIAGNOSIS, 93000: NORMAL
P-R INTERVAL, ECG05: 216 MS
Q-T INTERVAL, ECG07: 312 MS
QRS DURATION, ECG06: 78 MS
QTC CALCULATION (BEZET), ECG08: 402 MS
VENTRICULAR RATE, ECG03: 100 BPM

## 2021-03-02 PROBLEM — M54.41 CHRONIC BILATERAL LOW BACK PAIN WITH BILATERAL SCIATICA: Status: ACTIVE | Noted: 2021-03-02

## 2021-03-02 PROBLEM — M79.609 PAIN IN LIMB: Status: ACTIVE | Noted: 2021-03-02

## 2021-03-02 PROBLEM — G89.29 CHRONIC BILATERAL LOW BACK PAIN WITH BILATERAL SCIATICA: Status: ACTIVE | Noted: 2021-03-02

## 2021-03-02 PROBLEM — M54.42 CHRONIC BILATERAL LOW BACK PAIN WITH BILATERAL SCIATICA: Status: ACTIVE | Noted: 2021-03-02

## 2021-03-09 PROBLEM — R41.3 MEMORY LOSS: Status: ACTIVE | Noted: 2021-03-09

## 2021-03-09 PROBLEM — R07.89 CHEST DISCOMFORT: Status: ACTIVE | Noted: 2021-03-09

## 2021-03-24 ENCOUNTER — HOSPITAL ENCOUNTER (OUTPATIENT)
Dept: ULTRASOUND IMAGING | Age: 66
Discharge: HOME | End: 2021-03-24
Attending: NURSE PRACTITIONER
Payer: MEDICARE

## 2021-03-24 DIAGNOSIS — R30.0 DYSURIA: ICD-10-CM

## 2021-03-24 DIAGNOSIS — N20.0 KIDNEY STONE: ICD-10-CM

## 2021-03-24 DIAGNOSIS — R10.84 GENERALIZED ABDOMINAL PAIN: ICD-10-CM

## 2021-03-24 PROCEDURE — 76770 US EXAM ABDO BACK WALL COMP: CPT

## 2021-03-24 NOTE — PROGRESS NOTES
Kidney stone remains in the left kidney. It is unchanged from before. Not causing obstruction or pain. Please let me know if you wish to be set up for cystoscopy w bladder distention to evaluate for interstitial cystitis.

## 2021-03-26 ENCOUNTER — HOSPITAL ENCOUNTER (EMERGENCY)
Age: 66
Discharge: HOME | End: 2021-03-26
Attending: EMERGENCY MEDICINE
Payer: MEDICARE

## 2021-03-26 ENCOUNTER — APPOINTMENT (OUTPATIENT)
Dept: CT IMAGING | Age: 66
End: 2021-03-26
Attending: EMERGENCY MEDICINE
Payer: MEDICARE

## 2021-03-26 ENCOUNTER — APPOINTMENT (OUTPATIENT)
Dept: MRI IMAGING | Age: 66
End: 2021-03-26
Attending: EMERGENCY MEDICINE
Payer: MEDICARE

## 2021-03-26 VITALS
TEMPERATURE: 98 F | HEART RATE: 85 BPM | WEIGHT: 190 LBS | RESPIRATION RATE: 16 BRPM | OXYGEN SATURATION: 96 % | SYSTOLIC BLOOD PRESSURE: 135 MMHG | HEIGHT: 65 IN | DIASTOLIC BLOOD PRESSURE: 78 MMHG | BODY MASS INDEX: 31.65 KG/M2

## 2021-03-26 DIAGNOSIS — T50.Z95A ADVERSE EFFECT OF VACCINE, INITIAL ENCOUNTER: Primary | ICD-10-CM

## 2021-03-26 PROCEDURE — 70450 CT HEAD/BRAIN W/O DYE: CPT

## 2021-03-26 PROCEDURE — 74011250637 HC RX REV CODE- 250/637: Performed by: EMERGENCY MEDICINE

## 2021-03-26 PROCEDURE — 70551 MRI BRAIN STEM W/O DYE: CPT

## 2021-03-26 PROCEDURE — 99283 EMERGENCY DEPT VISIT LOW MDM: CPT

## 2021-03-26 RX ORDER — DIPHENHYDRAMINE HCL 25 MG
50 CAPSULE ORAL
Status: COMPLETED | OUTPATIENT
Start: 2021-03-26 | End: 2021-03-26

## 2021-03-26 RX ORDER — DIPHENHYDRAMINE HCL 25 MG
50 CAPSULE ORAL
Status: DISCONTINUED | OUTPATIENT
Start: 2021-03-26 | End: 2021-03-26

## 2021-03-26 RX ADMIN — DIPHENHYDRAMINE HYDROCHLORIDE 50 MG: 25 CAPSULE ORAL at 17:41

## 2021-03-26 NOTE — ED TRIAGE NOTES
Pt ambulatory to triage wearing a mask. Pt reports \"brain fog\" starting at 1130. Pt had first COVID vaccine at 1030. Pt reports swelling and numbness to left side of face that started around 1300. Pt went to [facility] and was sent to ER for concern for stroke. Pt's face appears symmetrical with equal  strengths and no deficits noted. NIH 0. Pt reports the inside of her mouth feels bumpy and irritated. Pt reports previous allergic reactions to medications and products. Inside of pt's mouth on left cheek is swollen and irritated in appearance. Pt denies any difficulty swallowing or tongue swelling. Pt reports [facility] \"didn't treat me. They were afraid that I was having a stroke. They didn't want to give me any medicine or anything. \" Dr. Lorena Shi in triage for assessment. Code S not called.

## 2021-03-27 NOTE — ED PROVIDER NOTES
Patient states she got her first [facility] this morning around 10:30 am. She began to feel like her left cheek was swelling on the inside a few hours later. She then began to feel some numbness in the left side of her scalp and left cheek. She also felt that her brain was foggy and that she was having trouble with her thoughts. She was seen [facility] and referred here. She denies difficulty breathing or swallowing. No weakness in extremities. No numbness in extremities. No speech difficulty. She has had oral swelling in the past with an allergy to toothpaste. She has no history of stroke. She has had recent heart tests for left arm pain. Past Medical History:   Diagnosis Date    Anxiety     ASHD (arteriosclerotic heart disease)     CT Calcium = 39    Bilateral carpal tunnel syndrome 2020    Chronic bilateral low back pain with bilateral sciatica 3/2/2021    GERD (gastroesophageal reflux disease)     egd , DR Matilda Donohue    HLD (hyperlipidemia)     ascvd = 3.8 in , 5.7 in 2017    Interstitial cystitis     h/o interstitial cystitis    Menopause     Paresthesia of upper and lower extremities of both sides 2020    Episode 2 weeks, resolved.     Prediabetes     Sleep apnea     on cpap    Vitamin D deficiency 2018       Past Surgical History:   Procedure Laterality Date    HX ADENOIDECTOMY      HX  SECTION      HX TONSILLECTOMY           Family History:   Problem Relation Age of Onset    No Known Problems Mother     Hypertension Father        Social History     Socioeconomic History    Marital status:      Spouse name: Not on file    Number of children: Not on file    Years of education: Not on file    Highest education level: Not on file   Occupational History    Not on file   Social Needs    Financial resource strain: Not on file    Food insecurity     Worry: Not on file     Inability: Not on file   [employer] needs Medical: Not on file     Non-medical: Not on file   Tobacco Use    Smoking status: Former Smoker     Packs/day: 5.00     Years: 4.00     Pack years: 20.00     Types: Cigarettes     Quit date:      Years since quittin.2    Smokeless tobacco: Never Used   Substance and Sexual Activity    Alcohol use: Yes     Alcohol/week: 0.0 standard drinks    Drug use: Not on file    Sexual activity: Not on file   Lifestyle    Physical activity     Days per week: Not on file     Minutes per session: Not on file    Stress: Not on file   Relationships    Social connections     Talks on phone: Not on file     Gets together: Not on file     Attends Jew service: Not on file     Active member of club or organization: Not on file     Attends meetings of clubs or organizations: Not on file     Relationship status: Not on file    Intimate partner violence     Fear of current or ex partner: Not on file     Emotionally abused: Not on file     Physically abused: Not on file     Forced sexual activity: Not on file   Other Topics Concern     Service Not Asked    Blood Transfusions Not Asked    Caffeine Concern Not Asked    Occupational Exposure Not Asked    Hobby Hazards Not Asked    Sleep Concern Yes    Stress Concern Not Asked    Weight Concern Not Asked    Special Diet Not Asked    Back Care Not Asked    Exercise Not Asked    Bike Helmet Not Asked    [facility] Not Asked    Self-Exams Not Asked   Social History Narrative    Not on file         ALLERGIES: Sulfa dyne    Review of Systems   Constitutional: Negative for appetite change, chills and fever. HENT: Positive for facial swelling. Negative for dental problem, postnasal drip, rhinorrhea, sore throat, trouble swallowing and voice change. Eyes: Negative. Respiratory: Negative for cough and shortness of breath. Cardiovascular: Negative for chest pain, palpitations and leg swelling. Gastrointestinal: Negative.     Genitourinary: Negative. Musculoskeletal: Negative. Skin: Negative. Neurological: Positive for numbness (left facial and scalp). Negative for dizziness, tremors, seizures, syncope, facial asymmetry, speech difficulty, weakness, light-headedness and headaches. Hematological: Negative. Vitals:    03/26/21 1731 03/26/21 2146   BP: (!) 151/80 135/78   Pulse: 87 85   Resp: 18 16   Temp: 98 °F (36.7 °C)    SpO2: 96%    Weight: 86.2 kg (190 lb)    Height: 5' 5\" (1.651 m)             Physical Exam  Vitals signs and nursing note reviewed. Constitutional:       Appearance: She is well-developed. HENT:      Head: Normocephalic and atraumatic. Right Ear: External ear normal.      Left Ear: External ear normal.      Mouth/Throat:      Comments: Her left buccal mucosa is red and cobblestoned with mild swelling. Her right buccal mucosa is normal. Pharynx is normal. No tongue or lip swelling. Eyes:      General: No scleral icterus. Conjunctiva/sclera: Conjunctivae normal.      Pupils: Pupils are equal, round, and reactive to light. Neck:      Musculoskeletal: Normal range of motion and neck supple. Vascular: No JVD. Cardiovascular:      Rate and Rhythm: Normal rate and regular rhythm. Heart sounds: Normal heart sounds. Pulmonary:      Effort: Pulmonary effort is normal.      Breath sounds: Normal breath sounds. Abdominal:      General: Bowel sounds are normal.      Palpations: Abdomen is soft. There is no mass. Tenderness: There is no abdominal tenderness. Musculoskeletal: Normal range of motion. General: No tenderness. Skin:     General: Skin is warm and dry. Findings: No lesion or rash. Neurological:      Mental Status: She is alert and oriented to person, place, and time. Motor: No weakness.       Gait: Gait normal.      Comments: Subjective numbness left facial and left scalp parietal    Psychiatric:         Behavior: Behavior normal.          MDM  Number of Diagnoses or Management Options  Adverse effect of vaccine, initial encounter  Diagnosis management comments: Buccal mucosa swelling after COVID pfizer vaccine  Left facial and scalp numbness  CT head normal  MRI brain normal  Benadryl 50 mg po with improvement in symptoms  Results and instructions discussed with patient  Follow up with PCP  Return with new or worse symptoms.         Amount and/or Complexity of Data Reviewed  Tests in the radiology section of CPT®: ordered and reviewed  Review and summarize past medical records: yes    Patient Progress  Patient progress: improved         Procedures

## 2021-03-27 NOTE — ED NOTES
I have reviewed discharge instructions with the patient. The patient verbalized understanding. Patient left ED via Discharge Method: ambulatory to Home with  self). Opportunity for questions and clarification provided. Patient given 0 scripts. To continue your aftercare when you leave the hospital, you may receive an automated call from our care team to check in on how you are doing. This is a free service and part of our promise to provide the best care and service to meet your aftercare needs.  If you have questions, or wish to unsubscribe from this service please call [phone]. Thank you for Choosing our [facility] Emergency Department.

## 2021-11-02 ENCOUNTER — TRANSCRIBE ORDER (OUTPATIENT)
Dept: SCHEDULING | Age: 66
End: 2021-11-02

## 2021-11-02 DIAGNOSIS — R14.0 ABDOMINAL DISTENSION (GASEOUS): ICD-10-CM

## 2021-11-02 DIAGNOSIS — R10.11 RIGHT UPPER QUADRANT PAIN: Primary | ICD-10-CM

## 2021-11-02 DIAGNOSIS — R10.13 EPIGASTRIC PAIN: ICD-10-CM

## 2021-11-05 ENCOUNTER — HOSPITAL ENCOUNTER (OUTPATIENT)
Dept: LAB | Age: 66
Discharge: HOME | End: 2021-11-05
Payer: MEDICARE

## 2021-11-05 LAB — VIT B12 SERPL-MCNC: 330 PG/ML (ref 193–986)

## 2021-11-05 PROCEDURE — 82607 VITAMIN B-12: CPT

## 2021-11-05 PROCEDURE — 36415 COLL VENOUS BLD VENIPUNCTURE: CPT

## 2021-12-13 ENCOUNTER — HOSPITAL ENCOUNTER (OUTPATIENT)
Dept: NUCLEAR MEDICINE | Age: 66
Discharge: HOME | End: 2021-12-13
Attending: INTERNAL MEDICINE
Payer: MEDICARE

## 2021-12-13 DIAGNOSIS — R10.13 EPIGASTRIC PAIN: ICD-10-CM

## 2021-12-13 DIAGNOSIS — R10.11 RIGHT UPPER QUADRANT PAIN: ICD-10-CM

## 2021-12-13 DIAGNOSIS — R14.0 ABDOMINAL DISTENSION (GASEOUS): ICD-10-CM

## 2021-12-13 PROCEDURE — 78227 HEPATOBIL SYST IMAGE W/DRUG: CPT

## 2022-01-14 PROBLEM — B37.0 ORAL THRUSH: Status: ACTIVE | Noted: 2022-01-14

## 2022-01-14 PROBLEM — J02.9 SORE THROAT: Status: ACTIVE | Noted: 2022-01-14

## 2022-01-14 PROBLEM — K13.0 ANGULAR CHEILITIS: Status: ACTIVE | Noted: 2022-01-14

## 2022-03-18 PROBLEM — M54.42 CHRONIC BILATERAL LOW BACK PAIN WITH BILATERAL SCIATICA: Status: ACTIVE | Noted: 2021-03-02

## 2022-03-18 PROBLEM — M54.41 CHRONIC BILATERAL LOW BACK PAIN WITH BILATERAL SCIATICA: Status: ACTIVE | Noted: 2021-03-02

## 2022-03-18 PROBLEM — B37.0 ORAL THRUSH: Status: ACTIVE | Noted: 2022-01-14

## 2022-03-18 PROBLEM — R41.3 MEMORY LOSS: Status: ACTIVE | Noted: 2021-03-09

## 2022-03-18 PROBLEM — I25.84 CORONARY ARTERY DISEASE DUE TO CALCIFIED CORONARY LESION: Status: ACTIVE | Noted: 2020-09-02

## 2022-03-18 PROBLEM — G89.29 CHRONIC BILATERAL LOW BACK PAIN WITH BILATERAL SCIATICA: Status: ACTIVE | Noted: 2021-03-02

## 2022-03-18 PROBLEM — R31.29 MICROSCOPIC HEMATURIA: Status: ACTIVE | Noted: 2019-03-22

## 2022-03-18 PROBLEM — I25.10 CORONARY ARTERY DISEASE DUE TO CALCIFIED CORONARY LESION: Status: ACTIVE | Noted: 2020-09-02

## 2022-03-18 PROBLEM — K13.0 ANGULAR CHEILITIS: Status: ACTIVE | Noted: 2022-01-14

## 2022-03-18 PROBLEM — G47.00 PERSISTENT DISORDER OF INITIATING OR MAINTAINING SLEEP: Status: ACTIVE | Noted: 2018-10-11

## 2022-03-19 PROBLEM — E55.9 VITAMIN D DEFICIENCY: Status: ACTIVE | Noted: 2018-01-11

## 2022-03-19 PROBLEM — G56.03 BILATERAL CARPAL TUNNEL SYNDROME: Status: ACTIVE | Noted: 2020-12-08

## 2022-03-19 PROBLEM — J02.9 SORE THROAT: Status: ACTIVE | Noted: 2022-01-14

## 2022-03-19 PROBLEM — R20.2 PARESTHESIA OF UPPER AND LOWER EXTREMITIES OF BOTH SIDES: Status: ACTIVE | Noted: 2020-11-02

## 2022-03-20 PROBLEM — N30.10 INTERSTITIAL CYSTITIS: Status: ACTIVE | Noted: 2019-03-22

## 2022-03-20 PROBLEM — R07.89 CHEST DISCOMFORT: Status: ACTIVE | Noted: 2021-03-09

## 2022-03-20 PROBLEM — M79.609 PAIN IN LIMB: Status: ACTIVE | Noted: 2021-03-02

## 2022-05-26 RX ORDER — LISINOPRIL 20 MG/1
TABLET ORAL
Qty: 90 TABLET | Refills: 1 | OUTPATIENT
Start: 2022-05-26

## 2022-06-06 ENCOUNTER — TELEPHONE (OUTPATIENT)
Dept: CARDIOLOGY CLINIC | Age: 67
End: 2022-06-06

## 2022-06-07 RX ORDER — LISINOPRIL 20 MG/1
20 TABLET ORAL DAILY
Qty: 90 TABLET | Refills: 0 | Status: SHIPPED | OUTPATIENT
Start: 2022-06-07 | End: 2022-08-25 | Stop reason: SDUPTHER

## 2022-06-07 NOTE — TELEPHONE ENCOUNTER
Attempted to contact pt, no answer and unable to leave a vm. Submitted below prescription to requested pharmacy.      Requested Prescriptions     Signed Prescriptions Disp Refills    lisinopril (PRINIVIL;ZESTRIL) 20 MG tablet 90 tablet 0     Sig: Take 1 tablet by mouth daily Please call [facility] and schedule your yearly appointment     Authorizing Provider: Christoph Roche     Ordering User: Bailee Prasad

## 2022-08-08 RX ORDER — LISINOPRIL 20 MG/1
TABLET ORAL
Qty: 90 TABLET | Refills: 1 | OUTPATIENT
Start: 2022-08-08

## 2022-08-23 NOTE — TELEPHONE ENCOUNTER
MEDICATION REFILL REQUEST      Name of Medication:  Hydrochlorothiazide  Dose:  25 mg  Frequency: 1 a day  Quantity:  90  Days' supply: 90    Pharmacy Name/Location:  [place] [id number]    MEDICATION REFILL REQUEST      Name of Medication:  Lisinopril  Dose: 220 mg  Frequency:1 a day  Quantity:  90  Days' supply:  90  [facility] [phone]

## 2022-08-24 NOTE — TELEPHONE ENCOUNTER
Pt has not been seen since 4.2021 and is due for a follow up. Called pt to schedule an appointment. No answer. Lvm for pt to return my call.

## 2022-08-25 ENCOUNTER — TELEPHONE (OUTPATIENT)
Dept: CARDIOLOGY CLINIC | Age: 67
End: 2022-08-25

## 2022-08-25 RX ORDER — HYDROCHLOROTHIAZIDE 25 MG/1
25 TABLET ORAL DAILY
Qty: 90 TABLET | Refills: 0 | Status: SHIPPED | OUTPATIENT
Start: 2022-08-25 | End: 2022-10-19 | Stop reason: SDUPTHER

## 2022-08-25 RX ORDER — LISINOPRIL 20 MG/1
20 TABLET ORAL DAILY
Qty: 90 TABLET | Refills: 0 | Status: SHIPPED | OUTPATIENT
Start: 2022-08-25 | End: 2022-10-19 | Stop reason: SDUPTHER

## 2022-08-25 NOTE — TELEPHONE ENCOUNTER
Patient called stating she needs refills for the following prescriptions:    hydroCHLOROthiazide (HYDRODIURIL) 25 MG tablet    lisinopril (PRINIVIL;ZESTRIL) 20 MG tablet      Pharmacy    [address] [facility] [address]     [phone]          Patient made an appt for 9/14 at 4:00. Please call when available.

## 2022-08-25 NOTE — TELEPHONE ENCOUNTER
Pt called back and scheduled an appointment for 9.14.22. Rx pended and forwarded to Dr. Tamiko Robertson for signature.

## 2022-10-19 ENCOUNTER — OFFICE VISIT (OUTPATIENT)
Dept: CARDIOLOGY CLINIC | Age: 67
End: 2022-10-19
Payer: MEDICARE

## 2022-10-19 VITALS
DIASTOLIC BLOOD PRESSURE: 84 MMHG | WEIGHT: 197 LBS | SYSTOLIC BLOOD PRESSURE: 132 MMHG | HEIGHT: 65 IN | HEART RATE: 70 BPM | BODY MASS INDEX: 32.82 KG/M2

## 2022-10-19 DIAGNOSIS — E78.2 MIXED HYPERLIPIDEMIA: ICD-10-CM

## 2022-10-19 DIAGNOSIS — I25.10 ATHEROSCLEROSIS OF NATIVE CORONARY ARTERY OF NATIVE HEART WITHOUT ANGINA PECTORIS: ICD-10-CM

## 2022-10-19 DIAGNOSIS — I10 ESSENTIAL (PRIMARY) HYPERTENSION: ICD-10-CM

## 2022-10-19 DIAGNOSIS — I10 HYPERTENSION, UNSPECIFIED TYPE: Primary | ICD-10-CM

## 2022-10-19 PROCEDURE — 93000 ELECTROCARDIOGRAM COMPLETE: CPT | Performed by: INTERNAL MEDICINE

## 2022-10-19 PROCEDURE — 4004F PT TOBACCO SCREEN RCVD TLK: CPT | Performed by: INTERNAL MEDICINE

## 2022-10-19 PROCEDURE — 99214 OFFICE O/P EST MOD 30 MIN: CPT | Performed by: INTERNAL MEDICINE

## 2022-10-19 PROCEDURE — G8417 CALC BMI ABV UP PARAM F/U: HCPCS | Performed by: INTERNAL MEDICINE

## 2022-10-19 PROCEDURE — G8427 DOCREV CUR MEDS BY ELIG CLIN: HCPCS | Performed by: INTERNAL MEDICINE

## 2022-10-19 PROCEDURE — G8400 PT W/DXA NO RESULTS DOC: HCPCS | Performed by: INTERNAL MEDICINE

## 2022-10-19 PROCEDURE — 3017F COLORECTAL CA SCREEN DOC REV: CPT | Performed by: INTERNAL MEDICINE

## 2022-10-19 PROCEDURE — 1123F ACP DISCUSS/DSCN MKR DOCD: CPT | Performed by: INTERNAL MEDICINE

## 2022-10-19 PROCEDURE — 1090F PRES/ABSN URINE INCON ASSESS: CPT | Performed by: INTERNAL MEDICINE

## 2022-10-19 PROCEDURE — G8484 FLU IMMUNIZE NO ADMIN: HCPCS | Performed by: INTERNAL MEDICINE

## 2022-10-19 RX ORDER — LISINOPRIL 20 MG/1
20 TABLET ORAL DAILY
Qty: 90 TABLET | Refills: 3 | Status: SHIPPED | OUTPATIENT
Start: 2022-10-19

## 2022-10-19 RX ORDER — HYDROCHLOROTHIAZIDE 25 MG/1
25 TABLET ORAL DAILY
Qty: 90 TABLET | Refills: 3 | Status: SHIPPED | OUTPATIENT
Start: 2022-10-19

## 2022-10-19 NOTE — PROGRESS NOTES
[address] [place], [place]  [address], [address]  PHONE: [phone]    SUBJECTIVE: Levora Max  Rodrigo Humphreys (1955) is a 73 y.o. female seen for a follow up visit regarding the following:        ICD-10-CM ICD-9-CM   1. Agatston coronary artery calcium score less than 100  R93.1 793.2   2. Essential hypertension  I10 401.9   3. Coronary artery disease due to calcified coronary lesion  I25.10 414.00    I25.84 414.4   4. Mixed hyperlipidemia  E78.2 272.2     Here for report on her ADRIANA exam.  ADRIANA is normal.  2012 CCS 39  Stress test 2015 ok  Stress test 2019 ok    States that she has gone to the ED since she was last seen, and Dr. Odilon Ferrera started her on a new medication. She picked it up the following day, and around 20 minutes after she took it she had a sharp chest pain. When she was seen for her LE duplex and ADRIANA, she states that Dr. Odilon Ferrera took her off of the medication because of her chest pain. He told her to follow up with today's cardiology appointment. She states that she was told that she had EKG changes in the ED from her visit here this past month. She has a history of acid reflux and believes that pain is what caused her to go to the ED, but she says the new CP after taking the medication concerns her. She states that her daughter is concerned because a  at her work had heart disease and was completely unaware until he had an echo and cardiac CT done. He had several blockages, without pain, and the scans saved his life. Patient and her daughter are concerned that she could have asymptomatic heart disease. She states that she has undergone cardiac workup in the past, a stress test. Her heart murmur was discovered at that time. 04/13/21  Pt had an imaging stress test showing no ischemia and normal LV function. This is a low risk scan. Echo shows normal LV function. There is no significant valvular abnormalities. 10/19/22  Pt doing well. No chest pain. No palpitations. Patient denies syncope. No dyspnea. States they are taking meds. Maintains a normal level of activity for them without symptoms. No dizziness or lightheadedness. All above conditions stable. Patient has elevated LDL of 128 this is from February 2022. She has been intolerant to several statins. Nonstatin options which would be appropriate would be bempedoic acid in combination with Zetia        Past Medical History, Past Surgical History, Family history, Social History, and Medications were all reviewed with the patient today and updated as necessary. Outpatient Medications Marked as Taking for the 4/13/21 encounter (Office Visit) with Sharron Silva MD   Medication Sig Dispense Refill    LORazepam (ATIVAN) 0.5 mg tablet TAKE 1 TABLET BY MOUTH DAILY AS NEEDED FOR ANXIETY      lisinopriL (PRINIVIL, ZESTRIL) 20 mg tablet TAKE 1 TABLET BY MOUTH EVERY DAY 90 Tab 1    hydroCHLOROthiazide (HYDRODIURIL) 25 mg tablet Take 1 Tab by mouth daily. 90 Tab 1    pantoprazole (PROTONIX) 40 mg tablet Take 1 Tab by mouth daily. 90 Tab 1    aspirin delayed-release 81 mg tablet Take 1 Tab by mouth daily. 1 Tab 0    traZODone (DESYREL) 50 mg tablet Take 1 Tab by mouth nightly. (Patient taking differently: Take 50 mg by mouth nightly. As needed) 90 Tab 1    cpap machine kit by Does Not Apply route. 9 cm       Allergies   Allergen Reactions    Sulfa Dynallison Cooley     Past Medical History:   Diagnosis Date    Anxiety     ASHD (arteriosclerotic heart disease)     CT Calcium = 39    Bilateral carpal tunnel syndrome 12/8/2020    Chronic bilateral low back pain with bilateral sciatica 3/2/2021    GERD (gastroesophageal reflux disease)     egd 2016, DR Osman    HLD (hyperlipidemia)     ascvd = 3.8 in 2016, 5.7 in 2017    Interstitial cystitis     h/o interstitial cystitis    Menopause     Paresthesia of upper and lower extremities of both sides 11/2/2020    Episode 2 weeks, resolved.     Prediabetes     Sleep apnea on cpap    Vitamin D deficiency 2018     Past Surgical History:   Procedure Laterality Date    HX ADENOIDECTOMY      HX  SECTION      HX TONSILLECTOMY       Family History   Problem Relation Age of Onset    No Known Problems Mother     Hypertension Father       Social History     Tobacco Use    Smoking status: Former Smoker     Packs/day: 5.00     Years: 4.00     Pack years: 20.00     Types: Cigarettes     Quit date:      Years since quittin.2    Smokeless tobacco: Never Used   Substance Use Topics    Alcohol use: Yes     Alcohol/week: 0.0 standard drinks     [unfilled]    Current Outpatient Medications:     LORazepam (ATIVAN) 0.5 mg tablet, TAKE 1 TABLET BY MOUTH DAILY AS NEEDED FOR ANXIETY, Disp: , Rfl:     lisinopriL (PRINIVIL, ZESTRIL) 20 mg tablet, TAKE 1 TABLET BY MOUTH EVERY DAY, Disp: 90 Tab, Rfl: 1    hydroCHLOROthiazide (HYDRODIURIL) 25 mg tablet, Take 1 Tab by mouth daily. , Disp: 90 Tab, Rfl: 1    pantoprazole (PROTONIX) 40 mg tablet, Take 1 Tab by mouth daily. , Disp: 90 Tab, Rfl: 1    aspirin delayed-release 81 mg tablet, Take 1 Tab by mouth daily. , Disp: 1 Tab, Rfl: 0    traZODone (DESYREL) 50 mg tablet, Take 1 Tab by mouth nightly. (Patient taking differently: Take 50 mg by mouth nightly. As needed), Disp: 90 Tab, Rfl: 1    cpap machine kit, by Does Not Apply route. 9 cm, Disp: , Rfl:         ROS:  Review of Systems - General ROS: negative for - chills, fatigue or fever  Hematological and Lymphatic ROS: negative for - bleeding problems, bruising or jaundice  Respiratory ROS: no cough, shortness of breath, or wheezing  Cardiovascular ROS: positive for - chest pain  negative for - dyspnea on exertion, palpitations, rapid heart rate or shortness of breath  Gastrointestinal ROS: no abdominal pain, change in bowel habits, or black or bloody stools  Neurological ROS: no TIA or stroke symptoms  All other systems negative.       Wt Readings from Last 3 Encounters:   21 193 lb 3.2 oz (87.6 kg)   03/26/21 190 lb (86.2 kg)   03/26/21 190 lb (86.2 kg)     Temp Readings from Last 3 Encounters:   03/26/21 98 °F (36.7 °C)   03/26/21 99.1 °F (37.3 °C) (Temporal)   03/24/21 97.6 °F (36.4 °C) (Temporal)     BP Readings from Last 3 Encounters:   04/13/21 122/68   03/26/21 135/78   03/26/21 132/74     Pulse Readings from Last 3 Encounters:   04/13/21 76   03/26/21 85   03/26/21 88           PHYSICAL EXAM:  Visit Vitals  /68   Pulse 76   Ht 5' 5\" (1.651 m)   Wt 193 lb 3.2 oz (87.6 kg)   BMI 32.15 kg/m²       Physical Examination: General appearance - alert, well appearing, and in no distress  Mental status - alert, oriented to person, place, and time  Eyes - pupils equal and reactive, extraocular eye movements intact  Neck/lymph - supple, no significant adenopathy  Chest/lungs - clear to auscultation, no wheezes, rales or rhonchi, symmetric air entry  Heart/CV - normal rate, regular rhythm, normal S1, S2, no murmurs, rubs, clicks or gallops  Abdomen/GI - soft, nontender, nondistended, no masses or organomegaly  Musculoskeletal - no joint tenderness, deformity or swelling  Extremities - peripheral pulses normal, no pedal edema, no clubbing or cyanosis  Skin - normal coloration and turgor, no rashes, no suspicious skin lesions noted    EKG: unchanged from previous tracings.                    Medications reviewed and questions answered    Recent Results (from the past 672 hour(s))   AMB POC URINALYSIS DIP STICK AUTO W/O MICRO (PGU)    Collection Time: 03/24/21 10:06 AM   Result Value Ref Range    Glucose (UA POC) Negative Negative mg/dL    Bilirubin (UA POC) Negative Negative    Ketones (UA POC) Negative Negative    Specific gravity (UA POC) 1.020 1.001 - 1.035    Blood (UA POC) Negative Negative    pH (UA POC) 7 4.6 - 8.0    Protein (UA POC) Negative Negative    Urobilinogen (POC) 0.2 mg/dL     Nitrites (UA POC) Negative Negative    Leukocyte esterase (UA POC) Small Negative     Lab Results   Component Value Date/Time    Cholesterol, total 202 (H) 12/09/2020 11:11 AM    HDL Cholesterol 50 12/09/2020 11:11 AM    LDL, calculated 126 (H) 12/09/2020 11:11 AM    LDL, calculated 155 (H) 02/06/2020 09:15 AM    VLDL, calculated 26 12/09/2020 11:11 AM    VLDL, calculated 26 02/06/2020 09:15 AM    Triglyceride 148 12/09/2020 11:11 AM    CHOL/HDL Ratio 5.2 01/16/2017 08:46 AM         ASSESSMENT and PLAN        1. Essential hypertension  Patient has stable cardiac symptoms and findings. Continue meds as discussed. Follow appropriate dietary, lifestyle and risk factor modifications such as healthy weight active lifestyle and mediterranean type diet. Avoid tobacco use. 2. Other chest pain  Needs stress echo    3. Coronary artery disease due to calcified coronary lesion  The current medical regimen is effective;  continue present plan and medications. 4. Mixed hyperlipidemia  Lab Results   Component Value Date/Time    Cholesterol, total 202 (H) 12/09/2020 11:11 AM    HDL Cholesterol 50 12/09/2020 11:11 AM    LDL, calculated 126 (H) 12/09/2020 11:11 AM    LDL, calculated 155 (H) 02/06/2020 09:15 AM    VLDL, calculated 26 12/09/2020 11:11 AM    VLDL, calculated 26 02/06/2020 09:15 AM    Triglyceride 148 12/09/2020 11:11 AM    CHOL/HDL Ratio 5.2 01/16/2017 08:46 AM     Lab Results   Component Value Date/Time    TSH 1.810 03/10/2021 12:16 PM    TSH 1.910 12/09/2020 11:11 AM    TSH 1.460 09/23/2020 10:41 AM    TSH 2.840 01/11/2019 08:15 AM    TSH 2.790 10/18/2017 08:11 AM    TSH 1.57 08/20/2015 11:25 AM     The current medical regimen is effective;  continue present plan and medications. Patient appears to have non cardiac symptoms. No further cardiac workup indicated. Continue current meds and follow appropriate risk factor modifications and healthy lifestyle and dietary choices. No orders of the defined types were placed in this encounter.       Pt is instructed to follow all appropriate cardiac risk factor recommendations and to be compliant with meds and testing. Instructed to follow up appropriately and seek urgent medical care if acute or unstable issues arise. Results of some tests may be viewed thru [address] but this does not substitute for follow up with MD. If follow up is not scheduled pt is instructed to call for follow up      Follow-up and Dispositions    Return in about 1 year (around 4/13/2022).                Carole Damon MD  4/13/2021  11:04 AM

## 2022-11-16 ENCOUNTER — TELEPHONE (OUTPATIENT)
Dept: SLEEP MEDICINE | Age: 67
End: 2022-11-16

## 2022-11-21 ENCOUNTER — TELEPHONE (OUTPATIENT)
Dept: CARDIOLOGY CLINIC | Age: 67
End: 2022-11-21

## 2022-11-21 NOTE — TELEPHONE ENCOUNTER
Please call patient re: she is stating her cholesterol medication is not going to be covered and needs to know the name of her medication that Dr Hallie Rios put her on. Please advise.

## 2022-11-22 NOTE — TELEPHONE ENCOUNTER
The cholesterol medication that Dr. Maximo Mtz put the pt on is Nexlizet. Attempted to contact pt. No answer, vm full.

## 2023-01-04 NOTE — PROGRESS NOTES
[address] [place] [address] Court, [address] [facility]  [phone]    Patient Name:  Tha Urena  YOB: 1955      Office Visit 1/5/2023    CHIEF COMPLAINT:    Chief Complaint   Patient presents with    Sleep Apnea    Follow-up    CPAP/BiPAP         HISTORY OF PRESENT ILLNESS: This patient was seen today for follow-up of sleep apnea and insomnia. She is currently on CPAP at 9 cm H2O with C-Flex of 3 and using nasal pillows. Patient is doing well with her nasal pillows and seems to have actually improved her compliance since her last office visit. Her compliance based on her download was 99%. He is using and benefiting from her CPAP on a daily basis and her average daily use is 6 hours and 54 minutes. Her median leak is 0 and her 95th percentile leak is 1.6 L/min. Her AHI is well-controlled at 0.5/hour. Her machine is broken beyond repair and needs replacement. She had difficulty staying asleep throughout the night. She had tried Ambien in the past but no longer take it. I suggested to her that we can try different sleep aid with less side effects than Ambien. We can try her on low-dose of Sonata and adjust the dosage accordingly. Patient does report that she has some issues with her memory. She is seeing Dr. Shaan Gama who is evaluating her. She is followed by her primary care physician and cardiology as well. At this point we will order replacement machine for her and we will adjust her settings to 8-10 cm and give her longer ramp time since she feels the pressure is too much and when she turned the machine on. We will arrange a close follow-up in 4 months to determine her compliance and address any problem related to her CPAP machine.                Past Medical History:   Diagnosis Date    Anxiety     ASHD (arteriosclerotic heart disease)     CT Calcium = 39    Bilateral carpal tunnel syndrome 12/8/2020    Chronic bilateral low back pain with bilateral sciatica 3/2/2021    GERD (gastroesophageal reflux disease)     egd , DR Osman    HLD (hyperlipidemia)     ascvd = 3.8 in 2016, 5.7 in 2017    Interstitial cystitis     h/o interstitial cystitis    Menopause     Paresthesia of upper and lower extremities of both sides 2020    Episode 2 weeks, resolved. Prediabetes     Sleep apnea     on cpap    Vitamin D deficiency 2018         Patient Active Problem List   Diagnosis    Mixed hyperlipidemia    JAMES (obstructive sleep apnea)    Oral thrush    Angular cheilitis    Persistent disorder of initiating or maintaining sleep    Microscopic hematuria    Chronic bilateral low back pain with bilateral sciatica    Memory loss    Coronary artery disease due to calcified coronary lesion    Hypoxemia    Sore throat    Gastroesophageal reflux disease without esophagitis    Vitamin D deficiency    Paresthesia of upper and lower extremities of both sides    Anxiety    Bilateral carpal tunnel syndrome    Essential hypertension    Interstitial cystitis    Hyperglycemia    Pain in limb    Chest discomfort    BMI 30.0-30.9,adult          Past Surgical History:   Procedure Laterality Date    ADENOIDECTOMY       SECTION      TONSILLECTOMY         [unfilled]        Social History     Socioeconomic History    Marital status:      Spouse name: Not on file    Number of children: Not on file    Years of education: Not on file    Highest education level: Not on file   Occupational History    Not on file   Tobacco Use    Smoking status: Former     Packs/day: 5.00     Types: Cigarettes     Quit date: 2013     Years since quitting: 10.0    Smokeless tobacco: Never   Substance and Sexual Activity    Alcohol use:  Yes     Alcohol/week: 0.0 standard drinks    Drug use: Not on file    Sexual activity: Not on file   Other Topics Concern    Not on file   Social History Narrative    Not on file     Social Determinants of Health     Financial Resource Strain: Not on file   Food Insecurity: Not on file   Transportation Needs: Not on file   Physical Activity: Not on file   Stress: Not on file   Social Connections: Not on file   Intimate Partner Violence: Not on file   Housing Stability: Not on file         Family History   Problem Relation Age of Onset    Hypertension Father     No Known Problems Mother          Allergies   Allergen Reactions    Sulfa Antibiotics Hives         Current Outpatient Medications   Medication Sig    zaleplon (SONATA) 5 MG capsule Take 1 capsule by mouth nightly for 90 days. Max Daily Amount: 5 mg    hydroCHLOROthiazide (HYDRODIURIL) 25 MG tablet Take 1 tablet by mouth daily    lisinopril (PRINIVIL;ZESTRIL) 20 MG tablet Take 1 tablet by mouth daily    LORazepam (ATIVAN) 0.5 MG tablet TAKE 1 TABLET BY MOUTH DAILY AS NEEDED FOR ANXIETY    pantoprazole (PROTONIX) 40 MG tablet Take 40 mg by mouth daily    Bempedoic Acid-Ezetimibe 180-10 MG TABS Take 1 tablet by mouth daily (Patient not taking: Reported on 1/5/2023)    mupirocin (BACTROBAN) 2 % cream Apply topically 2 times daily (Patient not taking: Reported on 1/5/2023)     No current facility-administered medications for this visit. REVIEW OF SYSTEMS:   CONSTITUTIONAL:   There is no history of fever, chills, night sweats, weight loss, weight gain, persistent fatigue, or lethargy/hypersomnolence. CARDIAC:   No chest pain, pressure, discomfort, palpitations, orthopnea, murmurs, or edema. GI:   No dysphagia, heartburn reflux, nausea/vomiting, diarrhea, abdominal pain, or bleeding. NEURO:   There is no history of AMS, persistent headache, decreased level of consciousness, seizures, or motor or sensory deficits. PHYSICAL EXAM:    Vitals:    01/05/23 1002   BP: 118/74   Pulse: 76   Resp: 14   Temp: 97.2 °F (36.2 °C)   SpO2: 97%        GENERAL APPEARANCE:   The patient is normal weight and in no respiratory distress. HEENT:   PERRL. Conjunctivae unremarkable.    Nasal mucosa is without epistaxis, exudate, or polyps. Gums and dentition are unremarkable. There is  oropharyngeal narrowing. NECK/LYMPHATIC:   Symmetrical with no elevation of jugular venous pulsation. Trachea midline. No thyroid enlargement. No cervical adenopathy. LUNGS:   Normal respiratory effort with symmetrical lung expansion. Breath sounds are clear. HEART:   There is a regular rate and rhythm. No murmur, rub, or gallop. There is no edema in the lower extremities. ABDOMEN:   Soft and non-tender. No hepatosplenomegaly. Bowel sounds are normal.     NEURO:   The patient is alert and oriented to person, place, and time. Memory appears intact and mood is normal.  No gross sensorimotor deficits are present. ASSESSMENT:  (Medical Decision Making)      Diagnosis Orders   1. JAMES (obstructive sleep apnea), on CPAP at 9 cm with excellent compliance. The patient machine is broken beyond repair and needs replacement DME - DURABLE MEDICAL EQUIPMENT      2. Persistent disorder of initiating or maintaining sleep, will start her on Sonata 5 mg nightly zaleplon (SONATA) 5 MG capsule      3. Hypoxemia, improved with the CPAP treatment DME - DURABLE MEDICAL EQUIPMENT           PLAN:    Replacement machine will be ordered with a setting of 8-10 cm with humidity and EPR 3.   The ramp time will be 20 minutes starting at 5 cm pressure    She was instructed to follow proper sleep hygiene and positional therapy    She  will be scheduled for an appointment for CPAP set up and supply renewal    Maintain her follow-up with other providers    Return to the sleep center in 4 months or sooner if needed      Orders Placed This Encounter   Procedures    [facility] [address]  Phone: [address] [address]  Dept: [phone]      Patient Name: Butch Ma  : 1955  Gender: female  Address: [address] [place] [address] [facility] [address]  Patient phone number: [phone] (home)       Primary Insurance: Payor: MEDICARE / Plan: MEDICARE PART A AND B / Product Type: *No Product type* /   Subscriber ID: [id number] - (Medicare)      AMB Supply Order  Order Details     DME Location:   Order Date: 1/5/2023   The primary encounter diagnosis was JAMES (obstructive sleep apnea). Diagnoses of Persistent disorder of initiating or maintaining sleep and Hypoxemia were also pertinent to this visit.           (  X   )New Set-Up       machine   (     ) CPAP Unit  (  x   ) Auto CPAP Unit  (     ) BiLevel Unit  (     ) Auto BiLevel Unit  (     ) ASV         Length of need: 12 months    Pressure: 8-10  cmH20  EPR: 3  Ramp Time:    min    Starting Ramp Pressure:     cm H20    Patient had a diagnostic Apnea Hypopnea Index (AHI) of :       *SUPPLIES* Replace all as needed, or per coverage guidelines     Masks Type:  XS nasal pillow    (   ) -Full Face Mask (1 per 3 mon)  (    ) -Full Mask (1 per month) Interface/Cushion      (    ) -Nasal Mask (1 per 3 mon)  (    ) - Nasal Mask (1 per month) Interface/Cushion  (    ) -Pillow (2 per mon)  (    ) -Modyirdrw (1 per 6 mon)      _________________________________________________________________          Other Supplies:    (  X   )-Frrytrfq (1 per 6 mon)  ( X    )-Mvjmwb Tubing (1 per 3 mon)  (  X   )- Disposable Filter (2 per mon)  (   X  )-Bepvob Humidifier (1 per year)     (     )-Bxfetezpa (sometimes used with Full Face Mask) (1 per 6 mos)  (     )-Tubing-without heat (1 per 3 mos)  (     )-Non-Disposable Filter (1 per 6 mos)  (     )-Water Chamber (1 per 6 mos)  (     )-Humidifier non-heated (1 per 5 yrs)      Signed Date: 1/5/2023  Electronically Signed By: Dari Vazquez MD        Orders Placed This Encounter   Medications    zaleplon (SONATA) 5 MG capsule     Sig: Take 1 capsule by mouth nightly for 90 days. Max Daily Amount: 5 mg     Dispense:  30 capsule     Refill:  2          Over 50% of today's office visit was spent in face to face time reviewing test results, prognosis, importance of compliance, education about disease process, benefits of medications, instructions for management of acute flare-ups, and follow up plans. Total face to face time spent with patient and charting was 32 minutes.         Elie Antonio MD  Electronically signed

## 2023-01-05 ENCOUNTER — OFFICE VISIT (OUTPATIENT)
Dept: SLEEP MEDICINE | Age: 68
End: 2023-01-05
Payer: MEDICARE

## 2023-01-05 VITALS
TEMPERATURE: 97.2 F | HEIGHT: 65 IN | SYSTOLIC BLOOD PRESSURE: 118 MMHG | OXYGEN SATURATION: 97 % | BODY MASS INDEX: 33.49 KG/M2 | RESPIRATION RATE: 14 BRPM | WEIGHT: 201 LBS | HEART RATE: 76 BPM | DIASTOLIC BLOOD PRESSURE: 74 MMHG

## 2023-01-05 DIAGNOSIS — G47.33 OSA (OBSTRUCTIVE SLEEP APNEA): Primary | ICD-10-CM

## 2023-01-05 DIAGNOSIS — G47.00 PERSISTENT DISORDER OF INITIATING OR MAINTAINING SLEEP: ICD-10-CM

## 2023-01-05 DIAGNOSIS — R09.02 HYPOXEMIA: ICD-10-CM

## 2023-01-05 PROCEDURE — 1090F PRES/ABSN URINE INCON ASSESS: CPT | Performed by: INTERNAL MEDICINE

## 2023-01-05 PROCEDURE — 3074F SYST BP LT 130 MM HG: CPT | Performed by: INTERNAL MEDICINE

## 2023-01-05 PROCEDURE — 1036F TOBACCO NON-USER: CPT | Performed by: INTERNAL MEDICINE

## 2023-01-05 PROCEDURE — G8400 PT W/DXA NO RESULTS DOC: HCPCS | Performed by: INTERNAL MEDICINE

## 2023-01-05 PROCEDURE — 99214 OFFICE O/P EST MOD 30 MIN: CPT | Performed by: INTERNAL MEDICINE

## 2023-01-05 PROCEDURE — G8484 FLU IMMUNIZE NO ADMIN: HCPCS | Performed by: INTERNAL MEDICINE

## 2023-01-05 PROCEDURE — G8427 DOCREV CUR MEDS BY ELIG CLIN: HCPCS | Performed by: INTERNAL MEDICINE

## 2023-01-05 PROCEDURE — 3078F DIAST BP <80 MM HG: CPT | Performed by: INTERNAL MEDICINE

## 2023-01-05 PROCEDURE — G8417 CALC BMI ABV UP PARAM F/U: HCPCS | Performed by: INTERNAL MEDICINE

## 2023-01-05 PROCEDURE — 3017F COLORECTAL CA SCREEN DOC REV: CPT | Performed by: INTERNAL MEDICINE

## 2023-01-05 PROCEDURE — 1123F ACP DISCUSS/DSCN MKR DOCD: CPT | Performed by: INTERNAL MEDICINE

## 2023-01-05 RX ORDER — ZALEPLON 5 MG/1
5 CAPSULE ORAL NIGHTLY
Qty: 30 CAPSULE | Refills: 2 | Status: SHIPPED | OUTPATIENT
Start: 2023-01-05 | End: 2023-04-05

## 2023-01-05 ASSESSMENT — SLEEP AND FATIGUE QUESTIONNAIRES
HOW LIKELY ARE YOU TO NOD OFF OR FALL ASLEEP WHILE LYING DOWN TO REST IN THE AFTERNOON WHEN CIRCUMSTANCES PERMIT: 3
HOW LIKELY ARE YOU TO NOD OFF OR FALL ASLEEP WHILE WATCHING TV: 0
ESS TOTAL SCORE: 3
HOW LIKELY ARE YOU TO NOD OFF OR FALL ASLEEP WHILE SITTING QUIETLY AFTER LUNCH WITHOUT ALCOHOL: 0
HOW LIKELY ARE YOU TO NOD OFF OR FALL ASLEEP WHEN YOU ARE A PASSENGER IN A CAR FOR AN HOUR WITHOUT A BREAK: 0
HOW LIKELY ARE YOU TO NOD OFF OR FALL ASLEEP WHILE SITTING INACTIVE IN A PUBLIC PLACE: 0
HOW LIKELY ARE YOU TO NOD OFF OR FALL ASLEEP WHILE SITTING AND TALKING TO SOMEONE: 0
HOW LIKELY ARE YOU TO NOD OFF OR FALL ASLEEP IN A CAR, WHILE STOPPED FOR A FEW MINUTES IN TRAFFIC: 0
HOW LIKELY ARE YOU TO NOD OFF OR FALL ASLEEP WHILE SITTING AND READING: 0

## 2023-01-26 ENCOUNTER — TELEPHONE (OUTPATIENT)
Dept: SLEEP MEDICINE | Age: 68
End: 2023-01-26

## 2023-01-26 NOTE — TELEPHONE ENCOUNTER
Patient feels like pressure on her new cpap machine is too much.  Feels like it needs to be turned down

## 2023-02-06 ENCOUNTER — TELEPHONE (OUTPATIENT)
Dept: SLEEP MEDICINE | Age: 68
End: 2023-02-06

## 2023-02-06 NOTE — TELEPHONE ENCOUNTER
Pt states that the pressure is too much and has gone back to using her old machine which she believes is set at [employer]. Advised pt to use the machine during the day in order for her body to get used to the pressure.

## 2023-05-25 ENCOUNTER — OFFICE VISIT (OUTPATIENT)
Age: 68
End: 2023-05-25
Payer: MEDICARE

## 2023-05-25 VITALS
BODY MASS INDEX: 32.15 KG/M2 | WEIGHT: 193 LBS | DIASTOLIC BLOOD PRESSURE: 72 MMHG | SYSTOLIC BLOOD PRESSURE: 122 MMHG | HEART RATE: 80 BPM | HEIGHT: 65 IN

## 2023-05-25 DIAGNOSIS — I10 ESSENTIAL (PRIMARY) HYPERTENSION: Primary | ICD-10-CM

## 2023-05-25 DIAGNOSIS — E78.2 MIXED HYPERLIPIDEMIA: ICD-10-CM

## 2023-05-25 DIAGNOSIS — I25.10 ATHEROSCLEROSIS OF NATIVE CORONARY ARTERY OF NATIVE HEART WITHOUT ANGINA PECTORIS: ICD-10-CM

## 2023-05-25 PROCEDURE — 3017F COLORECTAL CA SCREEN DOC REV: CPT | Performed by: INTERNAL MEDICINE

## 2023-05-25 PROCEDURE — 3078F DIAST BP <80 MM HG: CPT | Performed by: INTERNAL MEDICINE

## 2023-05-25 PROCEDURE — 99214 OFFICE O/P EST MOD 30 MIN: CPT | Performed by: INTERNAL MEDICINE

## 2023-05-25 PROCEDURE — 1036F TOBACCO NON-USER: CPT | Performed by: INTERNAL MEDICINE

## 2023-05-25 PROCEDURE — 3074F SYST BP LT 130 MM HG: CPT | Performed by: INTERNAL MEDICINE

## 2023-05-25 PROCEDURE — G8417 CALC BMI ABV UP PARAM F/U: HCPCS | Performed by: INTERNAL MEDICINE

## 2023-05-25 PROCEDURE — 1123F ACP DISCUSS/DSCN MKR DOCD: CPT | Performed by: INTERNAL MEDICINE

## 2023-05-25 PROCEDURE — 1090F PRES/ABSN URINE INCON ASSESS: CPT | Performed by: INTERNAL MEDICINE

## 2023-05-25 PROCEDURE — G8400 PT W/DXA NO RESULTS DOC: HCPCS | Performed by: INTERNAL MEDICINE

## 2023-05-25 PROCEDURE — G8427 DOCREV CUR MEDS BY ELIG CLIN: HCPCS | Performed by: INTERNAL MEDICINE

## 2023-05-25 RX ORDER — PANTOPRAZOLE SODIUM 40 MG/1
40 TABLET, DELAYED RELEASE ORAL DAILY
Qty: 90 TABLET | Refills: 3 | Status: SHIPPED | OUTPATIENT
Start: 2023-05-25

## 2023-05-25 RX ORDER — HYDROCHLOROTHIAZIDE 25 MG/1
25 TABLET ORAL DAILY
Qty: 90 TABLET | Refills: 3 | Status: SHIPPED | OUTPATIENT
Start: 2023-05-25

## 2023-05-25 RX ORDER — FLUTICASONE PROPIONATE 0.05 %
CREAM (GRAM) TOPICAL
COMMUNITY
Start: 2023-05-15

## 2023-05-25 RX ORDER — LISINOPRIL 20 MG/1
20 TABLET ORAL DAILY
Qty: 90 TABLET | Refills: 3 | Status: SHIPPED | OUTPATIENT
Start: 2023-05-25

## 2023-05-25 NOTE — PROGRESS NOTES
[address] [place], [place]  [address], [address]  PHONE: [phone]    SUBJECTIVE: Hugo Gibson (1955) is a 73 y.o. female seen for a follow up visit regarding the following:        ICD-10-CM ICD-9-CM   1. Agatston coronary artery calcium score less than 100  R93.1 793.2   2. Essential hypertension  I10 401.9   3. Coronary artery disease due to calcified coronary lesion  I25.10 414.00    I25.84 414.4   4. Mixed hyperlipidemia  E78.2 272.2     Here for report on her ADRIANA exam.  ADRIANA is normal.  2012 CCS 39  Stress test 2015 ok  Stress test 2019 ok    States that she has gone to the ED since she was last seen, and Dr. Bharti Cota started her on a new medication. She picked it up the following day, and around 20 minutes after she took it she had a sharp chest pain. When she was seen for her LE duplex and ADRIANA, she states that Dr. Bharti Cota took her off of the medication because of her chest pain. He told her to follow up with today's cardiology appointment. She states that she was told that she had EKG changes in the ED from her visit here this past month. She has a history of acid reflux and believes that pain is what caused her to go to the ED, but she says the new CP after taking the medication concerns her. She states that her daughter is concerned because a  at her work had heart disease and was completely unaware until he had an echo and cardiac CT done. He had several blockages, without pain, and the scans saved his life. Patient and her daughter are concerned that she could have asymptomatic heart disease. She states that she has undergone cardiac workup in the past, a stress test. Her heart murmur was discovered at that time. 04/13/21  Pt had an imaging stress test showing no ischemia and normal LV function. This is a low risk scan. Echo shows normal LV function. There is no significant valvular abnormalities. 10/19/22  Pt doing well. No chest pain.

## 2023-07-17 ENCOUNTER — CLINICAL DOCUMENTATION (OUTPATIENT)
Dept: CARDIOLOGY CLINIC | Age: 68
End: 2023-07-17
Payer: MEDICARE

## 2023-07-17 ENCOUNTER — TELEPHONE (OUTPATIENT)
Age: 68
End: 2023-07-17

## 2023-07-17 DIAGNOSIS — I10 ESSENTIAL HYPERTENSION: Primary | ICD-10-CM

## 2023-07-17 DIAGNOSIS — I25.10 CORONARY ARTERY DISEASE DUE TO CALCIFIED CORONARY LESION: ICD-10-CM

## 2023-07-17 DIAGNOSIS — I10 HYPERTENSION, UNSPECIFIED TYPE: ICD-10-CM

## 2023-07-17 DIAGNOSIS — I25.10 ATHEROSCLEROSIS OF NATIVE CORONARY ARTERY OF NATIVE HEART WITHOUT ANGINA PECTORIS: Primary | ICD-10-CM

## 2023-07-17 DIAGNOSIS — I25.84 CORONARY ARTERY DISEASE DUE TO CALCIFIED CORONARY LESION: ICD-10-CM

## 2023-07-17 DIAGNOSIS — R07.2 CHEST PAIN, PRECORDIAL: ICD-10-CM

## 2023-07-17 PROCEDURE — 99423 OL DIG E/M SVC 21+ MIN: CPT | Performed by: INTERNAL MEDICINE

## 2023-07-17 RX ORDER — LISINOPRIL 40 MG/1
40 TABLET ORAL DAILY
Qty: 90 TABLET | Refills: 3
Start: 2023-07-17

## 2023-07-17 NOTE — TELEPHONE ENCOUNTER
Constant very bad indigestion-like dull aching pain in middle of chest since yesterday. Chest pain does not increase on exertion. Constant \"catch\" in  upper back, since last night. Frequent hot flashes with increased sweating during night, last night. No nausea, SOB, or radiation of pain with chest pain. History of frequent indigestion with similar discomfort, not relieved by Protonix 40 mg qd. Very hard heart beat, like \"hard knock\" or a thump, woke her from sleep x 3, last night. Did not sleep well. Slight light headed, swimmy headed feeling. Early morning BP-159/79 at home. Later this morning at work, BP-179/104. Very upset. Taking lisinopril 20 mg qd. Not currently taking HCTZ 25 mg qd. Patient asks for EKG to make sure discomfort is indigestion and not her heart, and asks for recommendations for chest pain and thumping in chest.      Advised patient that I will notify Dr. Jos Younger of above and call with his response. Advised patient that chest pain cannot be evaluated and treated appropriately in office setting, and is best addressed in ER, where rapid evaluation and treatment methods are available. Strongly encourage patient to go to [facility] ER for evaluation of chest pain, but patient states she does not want to go to ER, and asks for Dr. Ho Folds recommendations.

## 2023-07-17 NOTE — PROGRESS NOTES
Patient/outside initiated phone call  Time spent reviewing chart and responding is greater than21 minutes    Last patient encounter has been reviewed. Medications have been reviewed. Appropriate lab work has been reviewed. Appropriate studies have been reviewed    Patient initiated phone call is being addressed she is having heart thumping and knocking high blood pressure hot flashes I have reviewed her chart and we will double her lisinopril to 40 mg a day and order a Lexiscan Cardiolite. She has a very low calcium score and has had multiple noninvasive work-ups in the past that have all been negative.   I believe we can address these issues noninvasively and through the office

## 2023-07-17 NOTE — TELEPHONE ENCOUNTER
Per Dr. Naga Valencia, patient should increase lisinopril 20 mg qd to 40 mg qd and recommends lexiscan cardiolyte. Left message on voicemail for patient to call this triage nurse.    Requested Prescriptions     Signed Prescriptions Disp Refills    lisinopril (PRINIVIL;ZESTRIL) 40 MG tablet 90 tablet 3     Sig: Take 1 tablet by mouth daily     Authorizing Provider: Reed Andrew     Ordering User: Mayra Mock     Order placed for lexiscan cardiolyte stress test.

## 2023-07-17 NOTE — TELEPHONE ENCOUNTER
Pt states her BP has been extremely high (179/104) since she woke up this morning. States she didn't feel well and couldn't sleep and felt weird on her left side. States she experienced hot flashes. Pt states she has active CP.

## 2023-07-17 NOTE — TELEPHONE ENCOUNTER
Left message on voicemail for patient to call this triage nurse. In message, advised patient to go to [facility] ER, if she feels she is in distress.

## 2023-07-19 NOTE — TELEPHONE ENCOUNTER
Left message on voicemail for patient to call this triage nurse for instructions from Dr. Kathleen Arroyo.

## 2023-07-20 NOTE — TELEPHONE ENCOUNTER
Advised patient of Dr. Cha Mejia response. She BP has been okay since she resumed HCTZ 25 mg qd, which PCP asked her to D/C because HCTZ might increase uric acid with gout. Current BP-134/85. She states she will continue HCTZ, for now, and D/C HCTZ and increase lisinopril to 40 mg qd, if BP increases. Advised patient to call [phone] to schedule nuclear stress test, since she is not able  to received phone calls from this office number. Patient verbalized understanding.

## 2023-07-28 ENCOUNTER — TELEPHONE (OUTPATIENT)
Age: 68
End: 2023-07-28

## 2023-07-28 NOTE — TELEPHONE ENCOUNTER
Had SHAKA Tuesday. She thinks when the IV was placed in her arm she thinks it damaged a nerve because when she holds her hand a certain way her hand and fingers shake. It is getting better but still present. Does she need to  see a doctor/have testing to see what is causing this?

## 2023-07-28 NOTE — TELEPHONE ENCOUNTER
Humberto Montana MD  You 16 minutes ago (12:15 PM)     [facility]  I can see her next week when I am in the office she can be brought in for a nurse visit a day I am in the office and I would suggest that she have a meeting with Mikal Shelton to discuss the possibility of nerve damage from an IV which I think is almost nonexistent    I called and informed pt. of MD response and made appt. Tuesday 8/1 for nurse visit.

## 2023-07-28 NOTE — TELEPHONE ENCOUNTER
Patient said ever since her nuclear stress test   On 07/25/2023 her arm and hand shakes. Please call patient to advise.

## 2023-07-31 ENCOUNTER — TELEPHONE (OUTPATIENT)
Age: 68
End: 2023-07-31

## 2023-10-10 ENCOUNTER — TELEPHONE (OUTPATIENT)
Age: 68
End: 2023-10-10

## 2023-10-10 NOTE — TELEPHONE ENCOUNTER
Called pt. Notified her of Dr. Luis A Mir response. Pt still has concerns about switching meds. Offered pt appt to discuss her concerns. Pt scheduled appt for 10/26. Pt states she will continue current med regimen until she can discuss Losartan with Dr. Willam Carlson.

## 2023-10-26 ENCOUNTER — OFFICE VISIT (OUTPATIENT)
Age: 68
End: 2023-10-26

## 2023-10-26 VITALS
SYSTOLIC BLOOD PRESSURE: 132 MMHG | HEIGHT: 65 IN | DIASTOLIC BLOOD PRESSURE: 80 MMHG | WEIGHT: 196 LBS | BODY MASS INDEX: 32.65 KG/M2 | HEART RATE: 74 BPM

## 2023-10-26 DIAGNOSIS — E78.2 MIXED HYPERLIPIDEMIA: ICD-10-CM

## 2023-10-26 DIAGNOSIS — I10 ESSENTIAL HYPERTENSION: Primary | ICD-10-CM

## 2023-10-26 DIAGNOSIS — I25.10 ATHEROSCLEROSIS OF NATIVE CORONARY ARTERY OF NATIVE HEART WITHOUT ANGINA PECTORIS: ICD-10-CM

## 2023-10-26 NOTE — PROGRESS NOTES
tablet by mouth daily, Disp: 90 tablet, Rfl: 3    hydroCHLOROthiazide (HYDRODIURIL) 25 MG tablet, Take 1 tablet by mouth daily, Disp: 90 tablet, Rfl: 3    LORazepam (ATIVAN) 0.5 MG tablet, TAKE 1 TABLET BY MOUTH DAILY AS NEEDED FOR ANXIETY, Disp: , Rfl:     mupirocin (BACTROBAN) 2 % cream, Apply topically 2 times daily (Patient not taking: Reported on 10/26/2023), Disp: , Rfl:     Dori Jenkins MD  10/26/2023  1:37 PM    Pt is instructed to follow all appropriate cardiac risk factor recommendations and to be compliant with meds and testing. Instructed to follow up appropriately and seek urgent medical care if acute or unstable issues arise. Results of some tests may be viewed thru [address] [place] [address] but this does not substitute for follow up with MD. If follow up is not scheduled pt is instructed to call for follow up.   Any non cardiac issues identified in this visit the patient is counseled to address these with their primary care physician or appropriate specialist.    Dori Jenkins MD

## 2024-02-09 ENCOUNTER — TELEPHONE (OUTPATIENT)
Dept: PULMONOLOGY | Age: 69
End: 2024-02-09

## 2024-02-16 RX ORDER — LISINOPRIL 40 MG/1
40 TABLET ORAL DAILY
Qty: 90 TABLET | Refills: 2 | Status: SHIPPED | OUTPATIENT
Start: 2024-02-16

## 2024-02-16 NOTE — TELEPHONE ENCOUNTER
MEDICATION REFILL REQUEST      Name of Medication:  lisinopril   Dose:  20 mg  Frequency:  daily   Quantity:    Days' supply:  90      Pharmacy Name/Location:  [facility]

## 2024-02-16 NOTE — TELEPHONE ENCOUNTER
Requested Prescriptions     Pending Prescriptions Disp Refills    lisinopril (PRINIVIL;ZESTRIL) 40 MG tablet 90 tablet 2     Sig: Take 1 tablet by mouth daily         Verified rx. Last OV 10/26/23. Erx to pharm on file.

## 2024-02-19 ENCOUNTER — TELEPHONE (OUTPATIENT)
Age: 69
End: 2024-02-19

## 2024-02-19 RX ORDER — LISINOPRIL 20 MG/1
20 TABLET ORAL 2 TIMES DAILY
Qty: 180 TABLET | Refills: 1 | Status: SHIPPED | OUTPATIENT
Start: 2024-02-19

## 2024-02-19 RX ORDER — LISINOPRIL 20 MG/1
20 TABLET ORAL 2 TIMES DAILY
Qty: 180 TABLET | Refills: 1 | Status: CANCELLED | OUTPATIENT
Start: 2024-02-19

## 2024-02-19 NOTE — TELEPHONE ENCOUNTER
MEDICATION REFILL REQUEST      Name of Medication:  lisinopril   Dose:  20 mg  Frequency:  twice a day   Quantity:    Days' supply:  90      Pharmacy Name/Location:  [facility] /Please call the 20 mg instead of the 40 mg

## 2024-04-26 ENCOUNTER — OFFICE VISIT (OUTPATIENT)
Dept: SLEEP MEDICINE | Age: 69
End: 2024-04-26
Payer: MEDICARE

## 2024-04-26 VITALS
RESPIRATION RATE: 16 BRPM | DIASTOLIC BLOOD PRESSURE: 71 MMHG | OXYGEN SATURATION: 93 % | TEMPERATURE: 97.9 F | WEIGHT: 198 LBS | SYSTOLIC BLOOD PRESSURE: 108 MMHG | HEIGHT: 65 IN | HEART RATE: 78 BPM | BODY MASS INDEX: 32.99 KG/M2

## 2024-04-26 DIAGNOSIS — G47.33 OSA (OBSTRUCTIVE SLEEP APNEA): Primary | ICD-10-CM

## 2024-04-26 DIAGNOSIS — G47.00 PERSISTENT DISORDER OF INITIATING OR MAINTAINING SLEEP: ICD-10-CM

## 2024-04-26 DIAGNOSIS — R09.02 HYPOXEMIA: ICD-10-CM

## 2024-04-26 PROCEDURE — 3017F COLORECTAL CA SCREEN DOC REV: CPT | Performed by: NURSE PRACTITIONER

## 2024-04-26 PROCEDURE — 3078F DIAST BP <80 MM HG: CPT | Performed by: NURSE PRACTITIONER

## 2024-04-26 PROCEDURE — G8400 PT W/DXA NO RESULTS DOC: HCPCS | Performed by: NURSE PRACTITIONER

## 2024-04-26 PROCEDURE — G8427 DOCREV CUR MEDS BY ELIG CLIN: HCPCS | Performed by: NURSE PRACTITIONER

## 2024-04-26 PROCEDURE — 1090F PRES/ABSN URINE INCON ASSESS: CPT | Performed by: NURSE PRACTITIONER

## 2024-04-26 PROCEDURE — 3074F SYST BP LT 130 MM HG: CPT | Performed by: NURSE PRACTITIONER

## 2024-04-26 PROCEDURE — 1123F ACP DISCUSS/DSCN MKR DOCD: CPT | Performed by: NURSE PRACTITIONER

## 2024-04-26 PROCEDURE — G8417 CALC BMI ABV UP PARAM F/U: HCPCS | Performed by: NURSE PRACTITIONER

## 2024-04-26 PROCEDURE — 99213 OFFICE O/P EST LOW 20 MIN: CPT | Performed by: NURSE PRACTITIONER

## 2024-04-26 PROCEDURE — 1036F TOBACCO NON-USER: CPT | Performed by: NURSE PRACTITIONER

## 2024-04-26 RX ORDER — FAMOTIDINE 40 MG/1
40 TABLET, FILM COATED ORAL PRN
COMMUNITY
Start: 2024-03-03

## 2024-04-26 ASSESSMENT — SLEEP AND FATIGUE QUESTIONNAIRES
ESS TOTAL SCORE: 3
HOW LIKELY ARE YOU TO NOD OFF OR FALL ASLEEP IN A CAR, WHILE STOPPED FOR A FEW MINUTES IN TRAFFIC: WOULD NEVER DOZE
HOW LIKELY ARE YOU TO NOD OFF OR FALL ASLEEP WHEN YOU ARE A PASSENGER IN A CAR FOR AN HOUR WITHOUT A BREAK: WOULD NEVER DOZE
HOW LIKELY ARE YOU TO NOD OFF OR FALL ASLEEP WHILE SITTING AND READING: WOULD NEVER DOZE
HOW LIKELY ARE YOU TO NOD OFF OR FALL ASLEEP WHILE SITTING INACTIVE IN A PUBLIC PLACE: WOULD NEVER DOZE
HOW LIKELY ARE YOU TO NOD OFF OR FALL ASLEEP WHILE LYING DOWN TO REST IN THE AFTERNOON WHEN CIRCUMSTANCES PERMIT: HIGH CHANCE OF DOZING
HOW LIKELY ARE YOU TO NOD OFF OR FALL ASLEEP WHILE WATCHING TV: WOULD NEVER DOZE
HOW LIKELY ARE YOU TO NOD OFF OR FALL ASLEEP WHILE SITTING QUIETLY AFTER LUNCH WITHOUT ALCOHOL: WOULD NEVER DOZE
HOW LIKELY ARE YOU TO NOD OFF OR FALL ASLEEP WHILE SITTING AND TALKING TO SOMEONE: WOULD NEVER DOZE

## 2024-04-26 NOTE — PROGRESS NOTES
lethargy/hypersomnolence.   CARDIAC:   No chest pain, pressure, discomfort, palpitations, orthopnea, murmurs, or edema.   GI:   No dysphagia, heartburn reflux, nausea/vomiting, diarrhea, abdominal pain, or bleeding.   NEURO:   There is no history of AMS, persistent headache, decreased level of consciousness, seizures, or motor or sensory deficits.      PHYSICAL EXAM:    Vitals:    04/26/24 0936   BP: 108/71   Pulse: 78   Resp: 16   Temp: 97.9 °F (36.6 °C)   SpO2: 93%   Weight: 89.8 kg (198 lb)   Height: 1.651 m (5' 5\")        Body mass index is 32.95 kg/m².         GENERAL APPEARANCE:   The patient is obese and in no respiratory distress.   HEENT:   PERRL.  Conjunctivae unremarkable.   Nasal mucosa is without epistaxis, exudate, or polyps.  Gums and dentition are unremarkable.     NECK/LYMPHATIC:   Symmetrical with no elevation of jugular venous pulsation.  Trachea midline. No thyroid enlargement.  No cervical adenopathy.   LUNGS:   Normal respiratory effort with symmetrical lung expansion.   Breath sounds clear bilaterally.   HEART:   There is a regular rate and rhythm.  No murmur, rub, or gallop.  There is no edema in the lower extremities.   ABDOMEN:   Soft and non-tender.  No hepatosplenomegaly.  Bowel sounds are normal.     NEURO:   The patient is alert and oriented to person, place, and time.  Memory appears intact and mood is normal.  No gross sensorimotor deficits are present.          ASSESSMENT:  (Medical Decision Making)      Diagnosis Orders   1. JAMES (obstructive sleep apnea)  DME - DURABLE MEDICAL EQUIPMENT   She is using and benefiting from pap therapy. Continue current settings. Renew supplies     2. Persistent disorder of initiating or maintaining sleep     Transient at this time due to bladder irritation. She is hoping this will improve once she sees the urology soon     3. Hypoxemia  DME - DURABLE MEDICAL EQUIPMENT   Improved on pap therapy         PLAN:  Continue CPAP 8 cm with nightly compliance.

## 2024-04-26 NOTE — PATIENT INSTRUCTIONS
"Pt admitted from station 55 due to status epilepticus. When patient arrives, pt was unresponsive, did not follow any commends, did withdraw to pain on right side only, did say \"NO\" when patient turned to side. MD came and evaluated patient and decided to intubated patient for airway protection. Prop and fent drip were stated . Pt had an episode of hypotension, prop drip was tuned off, MD was notified , and pt was given 1L of LR, which the patient responded. Spouse was updated with plan of care.  " Continue CPAP with nightly compliance  Follow up in 1 year or sooner if needed

## 2024-05-14 ENCOUNTER — OFFICE VISIT (OUTPATIENT)
Dept: UROLOGY | Age: 69
End: 2024-05-14
Payer: MEDICARE

## 2024-05-14 DIAGNOSIS — R39.89 BLADDER PAIN: ICD-10-CM

## 2024-05-14 DIAGNOSIS — R31.29 MICROHEMATURIA: ICD-10-CM

## 2024-05-14 DIAGNOSIS — N20.0 RENAL STONE: ICD-10-CM

## 2024-05-14 DIAGNOSIS — N30.10 INTERSTITIAL CYSTITIS: Primary | ICD-10-CM

## 2024-05-14 LAB
BILIRUBIN, URINE, POC: NEGATIVE
BLOOD URINE, POC: NORMAL
GLUCOSE URINE, POC: NEGATIVE
KETONES, URINE, POC: NEGATIVE
LEUKOCYTE ESTERASE, URINE, POC: NORMAL
NITRITE, URINE, POC: NEGATIVE
PH, URINE, POC: 5.5 (ref 4.6–8)
PROTEIN,URINE, POC: NEGATIVE
SPECIFIC GRAVITY, URINE, POC: 1.01 (ref 1–1.03)
URINALYSIS CLARITY, POC: NORMAL
URINALYSIS COLOR, POC: NORMAL
UROBILINOGEN, POC: NORMAL

## 2024-05-14 PROCEDURE — 81003 URINALYSIS AUTO W/O SCOPE: CPT | Performed by: NURSE PRACTITIONER

## 2024-05-14 PROCEDURE — 3017F COLORECTAL CA SCREEN DOC REV: CPT | Performed by: NURSE PRACTITIONER

## 2024-05-14 PROCEDURE — G8400 PT W/DXA NO RESULTS DOC: HCPCS | Performed by: NURSE PRACTITIONER

## 2024-05-14 PROCEDURE — 99204 OFFICE O/P NEW MOD 45 MIN: CPT | Performed by: NURSE PRACTITIONER

## 2024-05-14 PROCEDURE — 1123F ACP DISCUSS/DSCN MKR DOCD: CPT | Performed by: NURSE PRACTITIONER

## 2024-05-14 PROCEDURE — G8417 CALC BMI ABV UP PARAM F/U: HCPCS | Performed by: NURSE PRACTITIONER

## 2024-05-14 PROCEDURE — G8427 DOCREV CUR MEDS BY ELIG CLIN: HCPCS | Performed by: NURSE PRACTITIONER

## 2024-05-14 PROCEDURE — 1090F PRES/ABSN URINE INCON ASSESS: CPT | Performed by: NURSE PRACTITIONER

## 2024-05-14 PROCEDURE — 1036F TOBACCO NON-USER: CPT | Performed by: NURSE PRACTITIONER

## 2024-05-14 RX ORDER — METHENAMINE, BENZOIC ACID, PHENYL SALICYLATE, METHYLENE BLUE, AND HYOSCYAMINE SULFATE 9; .12; 81.6; 10.8; 36.2 MG/1; MG/1; MG/1; MG/1; MG/1
81.6 TABLET, COATED ORAL 4 TIMES DAILY
Qty: 30 TABLET | Refills: 3 | Status: SHIPPED | OUTPATIENT
Start: 2024-05-14

## 2024-05-14 RX ORDER — HYDROXYZINE HYDROCHLORIDE 25 MG/1
25 TABLET, FILM COATED ORAL NIGHTLY PRN
Qty: 30 TABLET | Refills: 3 | Status: SHIPPED | OUTPATIENT
Start: 2024-05-14 | End: 2024-06-13

## 2024-05-14 ASSESSMENT — ENCOUNTER SYMPTOMS
ABDOMINAL PAIN: 0
EYE PAIN: 0
COUGH: 0
INDIGESTION: 0
HEARTBURN: 0
SHORTNESS OF BREATH: 0
WHEEZING: 0
BACK PAIN: 0
BLOOD IN STOOL: 0
EYE DISCHARGE: 0
DIARRHEA: 0
SKIN LESIONS: 0
VOMITING: 0
NAUSEA: 0

## 2024-05-14 NOTE — PROGRESS NOTES
[facility]  [address] [place] [id number]  [phone]          Benjie Lowe  : 1955    Chief Complaint   Patient presents with    New Patient    Dysuria          HPI     Benjie Lowe is a 69 y.o. female who has h/o hematuria, renal stone, and IC.  last seen in  for microhematuria and gross hematuria. At that time, CT showed no obvious etiology for hematuria. Specifically NO passing renal stones and NO cancers. There was a renal stone noted in the left kidney. This did not appear to be the source of the hematuria. She was then seen in office by Dr Hyde for cystoscopy. The bladder neck did have some areas of small cysts ?cystitis cystica but otherwise WNL. ?IC.  In the past, she did have a history of possible interstitial cystitis around the time she had her children in her 30s.       She was referred back today for 6 week h/o pelvic pressure, dysuria, urinary urgency. Initial took course of antibiotic. Urine culture returned wo bacteria. Had work up with GYN that was normal. Today she is mostly bothered by bladder pain. Took AZO and pyridium wo success. Tried ?elavil. This caused severe drowsiness. Some relief w Ativan. She prev had Remzo instills that helped.         Past Medical History:   Diagnosis Date    Anxiety     ASHD (arteriosclerotic heart disease)     CT Calcium = 39    Bilateral carpal tunnel syndrome 2020    Chronic bilateral low back pain with bilateral sciatica 3/2/2021    GERD (gastroesophageal reflux disease)     egd , DR Osman    HLD (hyperlipidemia)     ascvd = 3.8 in , 5.7 in 2017    Interstitial cystitis     h/o interstitial cystitis    Menopause     Paresthesia of upper and lower extremities of both sides 2020    Episode 2 weeks, resolved.    Prediabetes     Sleep apnea     on cpap    Vitamin D deficiency 2018     Past Surgical History:   Procedure Laterality Date    ADENOIDECTOMY

## 2024-05-22 ENCOUNTER — HOSPITAL ENCOUNTER (OUTPATIENT)
Dept: ULTRASOUND IMAGING | Age: 69
Discharge: HOME | End: 2024-05-25
Payer: MEDICARE

## 2024-05-22 DIAGNOSIS — N20.0 RENAL STONE: ICD-10-CM

## 2024-05-22 DIAGNOSIS — N30.10 INTERSTITIAL CYSTITIS: ICD-10-CM

## 2024-05-22 DIAGNOSIS — R31.29 MICROHEMATURIA: ICD-10-CM

## 2024-05-22 PROCEDURE — 76770 US EXAM ABDO BACK WALL COMP: CPT

## 2024-06-10 RX ORDER — HYDROXYZINE HYDROCHLORIDE 25 MG/1
25 TABLET, FILM COATED ORAL NIGHTLY PRN
Qty: 90 TABLET | Refills: 2 | Status: SHIPPED | OUTPATIENT
Start: 2024-06-10

## 2024-07-31 ENCOUNTER — OFFICE VISIT (OUTPATIENT)
Age: 69
End: 2024-07-31
Payer: MEDICARE

## 2024-07-31 VITALS
WEIGHT: 198.5 LBS | SYSTOLIC BLOOD PRESSURE: 138 MMHG | BODY MASS INDEX: 33.07 KG/M2 | HEART RATE: 78 BPM | HEIGHT: 65 IN | DIASTOLIC BLOOD PRESSURE: 72 MMHG

## 2024-07-31 DIAGNOSIS — I10 ESSENTIAL HYPERTENSION: Primary | ICD-10-CM

## 2024-07-31 DIAGNOSIS — E78.2 MIXED HYPERLIPIDEMIA: ICD-10-CM

## 2024-07-31 DIAGNOSIS — I25.10 ATHEROSCLEROSIS OF NATIVE CORONARY ARTERY OF NATIVE HEART WITHOUT ANGINA PECTORIS: ICD-10-CM

## 2024-07-31 PROCEDURE — 3075F SYST BP GE 130 - 139MM HG: CPT | Performed by: INTERNAL MEDICINE

## 2024-07-31 PROCEDURE — G8428 CUR MEDS NOT DOCUMENT: HCPCS | Performed by: INTERNAL MEDICINE

## 2024-07-31 PROCEDURE — G8417 CALC BMI ABV UP PARAM F/U: HCPCS | Performed by: INTERNAL MEDICINE

## 2024-07-31 PROCEDURE — G8400 PT W/DXA NO RESULTS DOC: HCPCS | Performed by: INTERNAL MEDICINE

## 2024-07-31 PROCEDURE — 3078F DIAST BP <80 MM HG: CPT | Performed by: INTERNAL MEDICINE

## 2024-07-31 PROCEDURE — 99214 OFFICE O/P EST MOD 30 MIN: CPT | Performed by: INTERNAL MEDICINE

## 2024-07-31 PROCEDURE — 3017F COLORECTAL CA SCREEN DOC REV: CPT | Performed by: INTERNAL MEDICINE

## 2024-07-31 PROCEDURE — 1090F PRES/ABSN URINE INCON ASSESS: CPT | Performed by: INTERNAL MEDICINE

## 2024-07-31 PROCEDURE — 1123F ACP DISCUSS/DSCN MKR DOCD: CPT | Performed by: INTERNAL MEDICINE

## 2024-07-31 PROCEDURE — 1036F TOBACCO NON-USER: CPT | Performed by: INTERNAL MEDICINE

## 2024-07-31 RX ORDER — HYDROCHLOROTHIAZIDE 25 MG/1
25 TABLET ORAL DAILY
Qty: 90 TABLET | Refills: 3 | Status: SHIPPED | OUTPATIENT
Start: 2024-07-31

## 2024-07-31 RX ORDER — LISINOPRIL 20 MG/1
20 TABLET ORAL 2 TIMES DAILY
Qty: 180 TABLET | Refills: 1 | Status: SHIPPED | OUTPATIENT
Start: 2024-07-31

## 2024-07-31 NOTE — PROGRESS NOTES
[facility]ARDIOLOG[facility], [address], [address]  [place] [address]  PHONE: [phone]    SUBJECTIVE: /HPI  Desmine Favian Lowe (1955) is a 69 y.o. female seen for a follow up visit regarding the following:   Specialty Problems          Cardiology Problems    Essential hypertension        Mixed hyperlipidemia        Coronary artery disease due to calcified coronary lesion         Patient had echocardiogram done in 2023 that showed normal ejection fraction diastolic dysfunction mild aortic stenosis patient had a nuclear stress test done in July of last year that was normal    Past Medical History, Past Surgical History, Family history, Social History, and Medications were all reviewed with the patient today and updated as necessary.    Allergies   Allergen Reactions    Amoxicillin      Other reaction(s): Other- (not listed) - Allergy  \"horrific\" thrush    Colchicine      Other reaction(s): Lips/Mouth swelling-Allergy    Sucralfate      Other reaction(s): Myalgia-Intolerance    Sulfa Antibiotics Hives     Past Medical History:   Diagnosis Date    Anxiety     ASHD (arteriosclerotic heart disease)     CT Calcium = 39    Bilateral carpal tunnel syndrome 2020    Chronic bilateral low back pain with bilateral sciatica 3/2/2021    GERD (gastroesophageal reflux disease)     egd , DR Osman    HLD (hyperlipidemia)     ascvd = 3.8 in 2016, 5.7 in 2017    Interstitial cystitis     h/o interstitial cystitis    Menopause     Paresthesia of upper and lower extremities of both sides 2020    Episode 2 weeks, resolved.    Prediabetes     Sleep apnea     on cpap    Vitamin D deficiency 2018     Past Surgical History:   Procedure Laterality Date    ADENOIDECTOMY       SECTION      TONSILLECTOMY       Family History   Problem Relation Age of Onset    Hypertension Father     No Known Problems Mother       Social History     Tobacco Use    Smoking status: Former     Current

## 2025-02-11 ENCOUNTER — OFFICE VISIT (OUTPATIENT)
Age: 70
End: 2025-02-11
Payer: MEDICARE

## 2025-02-11 VITALS
SYSTOLIC BLOOD PRESSURE: 120 MMHG | HEIGHT: 65 IN | WEIGHT: 203 LBS | BODY MASS INDEX: 33.82 KG/M2 | DIASTOLIC BLOOD PRESSURE: 70 MMHG | HEART RATE: 77 BPM

## 2025-02-11 DIAGNOSIS — E78.2 MIXED HYPERLIPIDEMIA: Primary | ICD-10-CM

## 2025-02-11 DIAGNOSIS — I25.10 ATHEROSCLEROSIS OF NATIVE CORONARY ARTERY OF NATIVE HEART WITHOUT ANGINA PECTORIS: ICD-10-CM

## 2025-02-11 DIAGNOSIS — I25.10 CORONARY ARTERY DISEASE DUE TO CALCIFIED CORONARY LESION: ICD-10-CM

## 2025-02-11 DIAGNOSIS — I10 ESSENTIAL HYPERTENSION: ICD-10-CM

## 2025-02-11 DIAGNOSIS — I25.84 CORONARY ARTERY DISEASE DUE TO CALCIFIED CORONARY LESION: ICD-10-CM

## 2025-02-11 PROCEDURE — 3074F SYST BP LT 130 MM HG: CPT | Performed by: INTERNAL MEDICINE

## 2025-02-11 PROCEDURE — 93000 ELECTROCARDIOGRAM COMPLETE: CPT | Performed by: INTERNAL MEDICINE

## 2025-02-11 PROCEDURE — 1123F ACP DISCUSS/DSCN MKR DOCD: CPT | Performed by: INTERNAL MEDICINE

## 2025-02-11 PROCEDURE — 1126F AMNT PAIN NOTED NONE PRSNT: CPT | Performed by: INTERNAL MEDICINE

## 2025-02-11 PROCEDURE — G8400 PT W/DXA NO RESULTS DOC: HCPCS | Performed by: INTERNAL MEDICINE

## 2025-02-11 PROCEDURE — 1036F TOBACCO NON-USER: CPT | Performed by: INTERNAL MEDICINE

## 2025-02-11 PROCEDURE — 3078F DIAST BP <80 MM HG: CPT | Performed by: INTERNAL MEDICINE

## 2025-02-11 PROCEDURE — 3017F COLORECTAL CA SCREEN DOC REV: CPT | Performed by: INTERNAL MEDICINE

## 2025-02-11 PROCEDURE — 99214 OFFICE O/P EST MOD 30 MIN: CPT | Performed by: INTERNAL MEDICINE

## 2025-02-11 PROCEDURE — G8417 CALC BMI ABV UP PARAM F/U: HCPCS | Performed by: INTERNAL MEDICINE

## 2025-02-11 PROCEDURE — 1090F PRES/ABSN URINE INCON ASSESS: CPT | Performed by: INTERNAL MEDICINE

## 2025-02-11 PROCEDURE — G8428 CUR MEDS NOT DOCUMENT: HCPCS | Performed by: INTERNAL MEDICINE

## 2025-02-11 RX ORDER — ALLOPURINOL 100 MG/1
100 TABLET ORAL DAILY
COMMUNITY

## 2025-02-11 RX ORDER — SEMAGLUTIDE 1.34 MG/ML
INJECTION, SOLUTION SUBCUTANEOUS WEEKLY
COMMUNITY

## 2025-02-11 NOTE — PROGRESS NOTES
[facility] CARDIOLOGY, [address], [address]  [place] [address]  PHONE: [phone]    SUBJECTIVE: /HPI  Desmine Favian Lowe (1955) is a 69 y.o. female seen for a follow up visit regarding the following:   Specialty Problems          Cardiology Problems    Essential hypertension        Mixed hyperlipidemia        Coronary artery disease due to calcified coronary lesion         Pt doing well. No chest pain. No palpitations. Patient denies syncope. No dyspnea. States they are taking meds. Maintains a normal level of activity for them without symptoms. No dizziness or lightheadedness. All above conditions stable.      Past Medical History, Past Surgical History, Family history, Social History, and Medications were all reviewed with the patient today and updated as necessary.    Allergies   Allergen Reactions    Amoxicillin      Other reaction(s): Other- (not listed) - Allergy  \"horrific\" thrush    Colchicine      Other reaction(s): Lips/Mouth swelling-Allergy    Sucralfate      Other reaction(s): Myalgia-Intolerance    Sulfa Antibiotics Hives     Past Medical History:   Diagnosis Date    Anxiety     ASHD (arteriosclerotic heart disease)     CT Calcium = 39    Bilateral carpal tunnel syndrome 2020    Chronic bilateral low back pain with bilateral sciatica 3/2/2021    GERD (gastroesophageal reflux disease)     egd , DR Osman    HLD (hyperlipidemia)     ascvd = 3.8 in 2016, 5.7 in 2017    Interstitial cystitis     h/o interstitial cystitis    Menopause     Paresthesia of upper and lower extremities of both sides 2020    Episode 2 weeks, resolved.    Prediabetes     Sleep apnea     on cpap    Vitamin D deficiency 2018     Past Surgical History:   Procedure Laterality Date    ADENOIDECTOMY       SECTION      TONSILLECTOMY       Family History   Problem Relation Age of Onset    Hypertension Father     No Known Problems Mother       Social History     Tobacco Use    Smoking

## 2025-04-28 NOTE — PROGRESS NOTES
but she felt that it caused her joint pain to increase. She has stopped using it over the past two weeks. Today, she is suffering from another gout attack in her R big toe. Her foot pain has been prohibiting her activity levels. She is considering joining the [employer] for water aerobics.  Her BP is well controlled on medications today.              REVIEW OF SYSTEMS:    Review of Systems   Constitutional:  Negative for activity change, fatigue and unexpected weight change.   HENT:  Negative for congestion, nosebleeds, sore throat and voice change.    Respiratory:  Negative for apnea and shortness of breath.    Cardiovascular:  Negative for leg swelling.   Musculoskeletal:  Positive for arthralgias.   Skin:  Negative for rash.   Neurological:  Negative for dizziness, syncope, light-headedness and headaches.   Psychiatric/Behavioral:  Positive for sleep disturbance. Negative for agitation. The patient is not nervous/anxious.             [place] Sleepiness Scale      4/29/2025    11:31 AM 4/26/2024     9:38 AM 1/5/2023    10:08 AM   Sleep Medicine   Sitting and reading 2 0 0   Watching TV 0 0 0   Sitting, inactive in a public place (e.g. a theatre or a meeting) 0 0 0   As a passenger in a car for an hour without a break 0 0 0   Lying down to rest in the afternoon when circumstances permit 3 3 3   Sitting and talking to someone 0 0 0   Sitting quietly after a lunch without alcohol 0 0 0   In a car, while stopped for a few minutes in traffic 0 0 0   [place] Sleepiness Score 5 3 3          Past Medical History:   Diagnosis Date    Anxiety     ASHD (arteriosclerotic heart disease)     CT Calcium = 39    Bilateral carpal tunnel syndrome 12/8/2020    Chronic bilateral low back pain with bilateral sciatica 3/2/2021    GERD (gastroesophageal reflux disease)     egd 2016, DR Osman    HLD (hyperlipidemia)     ascvd = 3.8 in 2016, 5.7 in 2017    Interstitial cystitis     h/o interstitial cystitis    Menopause

## 2025-04-29 ENCOUNTER — OFFICE VISIT (OUTPATIENT)
Dept: SLEEP MEDICINE | Age: 70
End: 2025-04-29
Payer: MEDICARE

## 2025-04-29 VITALS
WEIGHT: 203 LBS | BODY MASS INDEX: 34.66 KG/M2 | RESPIRATION RATE: 16 BRPM | HEART RATE: 88 BPM | HEIGHT: 64 IN | DIASTOLIC BLOOD PRESSURE: 72 MMHG | SYSTOLIC BLOOD PRESSURE: 109 MMHG | OXYGEN SATURATION: 94 % | TEMPERATURE: 97.2 F

## 2025-04-29 DIAGNOSIS — E66.811 CLASS 1 OBESITY WITH SERIOUS COMORBIDITY AND BODY MASS INDEX (BMI) OF 34.0 TO 34.9 IN ADULT, UNSPECIFIED OBESITY TYPE: ICD-10-CM

## 2025-04-29 DIAGNOSIS — G47.33 OSA (OBSTRUCTIVE SLEEP APNEA): Primary | ICD-10-CM

## 2025-04-29 DIAGNOSIS — G47.00 PERSISTENT DISORDER OF INITIATING OR MAINTAINING SLEEP: ICD-10-CM

## 2025-04-29 DIAGNOSIS — R09.02 HYPOXEMIA: ICD-10-CM

## 2025-04-29 PROCEDURE — 3017F COLORECTAL CA SCREEN DOC REV: CPT | Performed by: NURSE PRACTITIONER

## 2025-04-29 PROCEDURE — 99213 OFFICE O/P EST LOW 20 MIN: CPT | Performed by: NURSE PRACTITIONER

## 2025-04-29 PROCEDURE — G8417 CALC BMI ABV UP PARAM F/U: HCPCS | Performed by: NURSE PRACTITIONER

## 2025-04-29 PROCEDURE — 3074F SYST BP LT 130 MM HG: CPT | Performed by: NURSE PRACTITIONER

## 2025-04-29 PROCEDURE — 1036F TOBACCO NON-USER: CPT | Performed by: NURSE PRACTITIONER

## 2025-04-29 PROCEDURE — 1123F ACP DISCUSS/DSCN MKR DOCD: CPT | Performed by: NURSE PRACTITIONER

## 2025-04-29 PROCEDURE — G2211 COMPLEX E/M VISIT ADD ON: HCPCS | Performed by: NURSE PRACTITIONER

## 2025-04-29 PROCEDURE — G8400 PT W/DXA NO RESULTS DOC: HCPCS | Performed by: NURSE PRACTITIONER

## 2025-04-29 PROCEDURE — 1090F PRES/ABSN URINE INCON ASSESS: CPT | Performed by: NURSE PRACTITIONER

## 2025-04-29 PROCEDURE — G8427 DOCREV CUR MEDS BY ELIG CLIN: HCPCS | Performed by: NURSE PRACTITIONER

## 2025-04-29 PROCEDURE — 1159F MED LIST DOCD IN RCRD: CPT | Performed by: NURSE PRACTITIONER

## 2025-04-29 PROCEDURE — 3078F DIAST BP <80 MM HG: CPT | Performed by: NURSE PRACTITIONER

## 2025-04-29 ASSESSMENT — SLEEP AND FATIGUE QUESTIONNAIRES
ESS TOTAL SCORE: 5
HOW LIKELY ARE YOU TO NOD OFF OR FALL ASLEEP IN A CAR, WHILE STOPPED FOR A FEW MINUTES IN TRAFFIC: WOULD NEVER DOZE
HOW LIKELY ARE YOU TO NOD OFF OR FALL ASLEEP WHILE SITTING INACTIVE IN A PUBLIC PLACE: WOULD NEVER DOZE
HOW LIKELY ARE YOU TO NOD OFF OR FALL ASLEEP WHILE SITTING AND TALKING TO SOMEONE: WOULD NEVER DOZE
HOW LIKELY ARE YOU TO NOD OFF OR FALL ASLEEP WHILE LYING DOWN TO REST IN THE AFTERNOON WHEN CIRCUMSTANCES PERMIT: HIGH CHANCE OF DOZING
HOW LIKELY ARE YOU TO NOD OFF OR FALL ASLEEP WHILE WATCHING TV: WOULD NEVER DOZE
HOW LIKELY ARE YOU TO NOD OFF OR FALL ASLEEP WHILE SITTING QUIETLY AFTER LUNCH WITHOUT ALCOHOL: WOULD NEVER DOZE
HOW LIKELY ARE YOU TO NOD OFF OR FALL ASLEEP WHEN YOU ARE A PASSENGER IN A CAR FOR AN HOUR WITHOUT A BREAK: WOULD NEVER DOZE
HOW LIKELY ARE YOU TO NOD OFF OR FALL ASLEEP WHILE SITTING AND READING: MODERATE CHANCE OF DOZING

## 2025-04-29 ASSESSMENT — ENCOUNTER SYMPTOMS
APNEA: 0
SORE THROAT: 0
SHORTNESS OF BREATH: 0
VOICE CHANGE: 0

## 2025-04-29 NOTE — PATIENT INSTRUCTIONS
Continue to keep up the good work with regular CPAP use.  Look into CP[employer] for headgear  I would like you to try extended release or time release melatonin. Please start with 5mg tablets. You should take the melatonin 60-90 mins before going to bed. This should help you fall asleep but also help you stay asleep without making you groggy. If 5mg is not enough, you could take 2 tablets to make it 10mg, but I would not recommend going higher on your dose. If it is not working, please call or send me a [employer] message.

## 2025-06-03 ENCOUNTER — OFFICE VISIT (OUTPATIENT)
Age: 70
End: 2025-06-03
Payer: MEDICARE

## 2025-06-03 ENCOUNTER — TELEPHONE (OUTPATIENT)
Dept: ORTHOPEDIC SURGERY | Age: 70
End: 2025-06-03

## 2025-06-03 DIAGNOSIS — M54.16 LUMBAR RADICULOPATHY: ICD-10-CM

## 2025-06-03 DIAGNOSIS — M54.50 LOW BACK PAIN, UNSPECIFIED BACK PAIN LATERALITY, UNSPECIFIED CHRONICITY, UNSPECIFIED WHETHER SCIATICA PRESENT: Primary | ICD-10-CM

## 2025-06-03 DIAGNOSIS — M47.816 FACET ARTHROPATHY, LUMBAR: ICD-10-CM

## 2025-06-03 PROCEDURE — G8417 CALC BMI ABV UP PARAM F/U: HCPCS | Performed by: PHYSICIAN ASSISTANT

## 2025-06-03 PROCEDURE — 1159F MED LIST DOCD IN RCRD: CPT | Performed by: PHYSICIAN ASSISTANT

## 2025-06-03 PROCEDURE — 99204 OFFICE O/P NEW MOD 45 MIN: CPT | Performed by: PHYSICIAN ASSISTANT

## 2025-06-03 PROCEDURE — 1036F TOBACCO NON-USER: CPT | Performed by: PHYSICIAN ASSISTANT

## 2025-06-03 PROCEDURE — 3017F COLORECTAL CA SCREEN DOC REV: CPT | Performed by: PHYSICIAN ASSISTANT

## 2025-06-03 PROCEDURE — G8400 PT W/DXA NO RESULTS DOC: HCPCS | Performed by: PHYSICIAN ASSISTANT

## 2025-06-03 PROCEDURE — G8427 DOCREV CUR MEDS BY ELIG CLIN: HCPCS | Performed by: PHYSICIAN ASSISTANT

## 2025-06-03 PROCEDURE — 1090F PRES/ABSN URINE INCON ASSESS: CPT | Performed by: PHYSICIAN ASSISTANT

## 2025-06-03 PROCEDURE — 1123F ACP DISCUSS/DSCN MKR DOCD: CPT | Performed by: PHYSICIAN ASSISTANT

## 2025-06-03 RX ORDER — GABAPENTIN 100 MG/1
100 CAPSULE ORAL 3 TIMES DAILY
Qty: 90 CAPSULE | Refills: 0 | Status: SHIPPED | OUTPATIENT
Start: 2025-06-03 | End: 2025-07-03

## 2025-06-03 RX ORDER — ERGOCALCIFEROL 1.25 MG/1
50000 CAPSULE, LIQUID FILLED ORAL WEEKLY
COMMUNITY
Start: 2025-03-24

## 2025-06-03 RX ORDER — ALLOPURINOL 300 MG/1
TABLET ORAL
COMMUNITY
Start: 2025-06-01

## 2025-06-03 RX ORDER — TRIAMCINOLONE ACETONIDE 1 MG/G
CREAM TOPICAL
COMMUNITY
Start: 2025-04-21

## 2025-06-03 RX ORDER — MECLIZINE HYDROCHLORIDE 25 MG/1
TABLET ORAL
COMMUNITY
Start: 2025-05-28

## 2025-06-03 RX ORDER — CELECOXIB 200 MG/1
200 CAPSULE ORAL DAILY
Qty: 30 CAPSULE | Refills: 0 | Status: SHIPPED | OUTPATIENT
Start: 2025-06-03 | End: 2025-07-03

## 2025-06-03 RX ORDER — METHYLPREDNISOLONE 4 MG/1
TABLET ORAL
COMMUNITY
Start: 2025-05-29 | End: 2025-06-03 | Stop reason: ALTCHOICE

## 2025-06-03 RX ORDER — COLCHICINE 0.6 MG/1
TABLET ORAL
COMMUNITY
Start: 2025-02-28

## 2025-06-03 RX ORDER — NEOMYCIN SULFATE, POLYMYXIN B SULFATE AND HYDROCORTISONE 10; 3.5; 1 MG/ML; MG/ML; [USP'U]/ML
SUSPENSION/ DROPS AURICULAR (OTIC)
COMMUNITY
Start: 2025-04-21

## 2025-06-03 RX ORDER — TRAMADOL HYDROCHLORIDE 50 MG/1
50 TABLET ORAL EVERY 6 HOURS PRN
Qty: 20 TABLET | Refills: 0 | Status: SHIPPED | OUTPATIENT
Start: 2025-06-03 | End: 2025-06-08

## 2025-06-03 RX ORDER — MAGNESIUM OXIDE 400 MG/1
400 TABLET ORAL DAILY
COMMUNITY
Start: 2025-03-24

## 2025-06-03 RX ORDER — AZITHROMYCIN 250 MG/1
TABLET, FILM COATED ORAL
COMMUNITY
Start: 2025-02-27

## 2025-06-03 NOTE — PROGRESS NOTES
Name: Benjie Lowe  YOB: 1955  Gender: female  MRN: [id number]    CC: Back Pain (Low back pain with thigh and calf pain)       History of Present Illness  The patient presents for evaluation of back pain.    Two weeks ago, she strained her thighs while ascending stairs carrying her eight-month-old granddaughter, resulting in severe pain in her tailbone, buttocks, and upper posterior thighs, rendering her bedridden. Initially prescribed a muscle relaxant, which she could not tolerate, she was then given prednisone, providing minimal relief. She supplemented with Tylenol, taking up to six tablets daily. Despite these interventions, her pain persisted, leading to another doctor's visit where she received a Toradol injection in her hip, providing temporary relief for six hours. She experienced excruciating pain radiating down the lateral aspect of her right leg, rated 10/10, with numbness in her foot and heel, and an inability to lift her leg.  She completed her course of prednisone. She has been taking lorazepam for sleep over the past two nights due to severe pain. She reports feeling woozy and unwell, but no loss of bladder control.               6/3/2025    12:09 PM   AMB PAIN ASSESSMENT   Location of Pain Back   Location Modifiers Medial   Severity of Pain 10   Quality of Pain Aching   Duration of Pain Persistent   Frequency of Pain Constant   Date Pain First Started 5/13/2025   Aggravating Factors Walking;Standing   Limiting Behavior Some   Relieving Factors Other (Comment)    Result of Injury No   Work-Related Injury No   Are there other pain locations you wish to document? No       Significant value              ROS/Meds/PSH/PMH/FH/SH: I personally reviewed the patient's collected intake data.  Below are the pertinents:    Allergies   Allergen Reactions    Sucralfate      Other reaction(s): Myalgia-Intolerance    Other Reaction(s): BRAIN FOG MUSCULAR PAIN    Penicillins      Other

## 2025-06-03 NOTE — TELEPHONE ENCOUNTER
Pt called she is in a lot pain.she asking  to be seen today,she has a lot numbness,please let julio .

## 2025-06-05 ENCOUNTER — TELEPHONE (OUTPATIENT)
Dept: ORTHOPEDIC SURGERY | Age: 70
End: 2025-06-05

## 2025-06-05 NOTE — TELEPHONE ENCOUNTER
Spoke with patient who states that her pain has not decreased yet and wants to know if there are any treatment options Jessica Wolf would recommend before her appointment on 6/10/25.  I informed her that she has not been taking the medication long enough to allow it into her system, and that Jessica would not be able to recommend any treatment before her appointment unless the results of her MRI were urgent.  The patient voiced understanding and compliance.

## 2025-06-06 ENCOUNTER — CLINICAL DOCUMENTATION (OUTPATIENT)
Dept: ORTHOPEDIC SURGERY | Age: 70
End: 2025-06-06

## 2025-06-18 ENCOUNTER — OFFICE VISIT (OUTPATIENT)
Age: 70
End: 2025-06-18
Payer: MEDICARE

## 2025-06-18 DIAGNOSIS — M51.16 LUMBAR DISC HERNIATION WITH RADICULOPATHY: Primary | ICD-10-CM

## 2025-06-18 DIAGNOSIS — M54.16 LUMBAR RADICULOPATHY: ICD-10-CM

## 2025-06-18 PROCEDURE — 1123F ACP DISCUSS/DSCN MKR DOCD: CPT | Performed by: PHYSICIAN ASSISTANT

## 2025-06-18 PROCEDURE — G8400 PT W/DXA NO RESULTS DOC: HCPCS | Performed by: PHYSICIAN ASSISTANT

## 2025-06-18 PROCEDURE — 1090F PRES/ABSN URINE INCON ASSESS: CPT | Performed by: PHYSICIAN ASSISTANT

## 2025-06-18 PROCEDURE — G8417 CALC BMI ABV UP PARAM F/U: HCPCS | Performed by: PHYSICIAN ASSISTANT

## 2025-06-18 PROCEDURE — G8428 CUR MEDS NOT DOCUMENT: HCPCS | Performed by: PHYSICIAN ASSISTANT

## 2025-06-18 PROCEDURE — 3017F COLORECTAL CA SCREEN DOC REV: CPT | Performed by: PHYSICIAN ASSISTANT

## 2025-06-18 PROCEDURE — 99214 OFFICE O/P EST MOD 30 MIN: CPT | Performed by: PHYSICIAN ASSISTANT

## 2025-06-18 PROCEDURE — 1036F TOBACCO NON-USER: CPT | Performed by: PHYSICIAN ASSISTANT

## 2025-06-18 NOTE — PROGRESS NOTES
Name: Benjie Lowe  YOB: 1955  Gender: female  MRN: [id number]    CC: Results (MRI Lumbar spine)       History of Present Illness  Mid May, she strained her thighs while ascending stairs carrying her eight-month-old granddaughter, resulting in severe pain in her tailbone, buttocks, and upper posterior thighs, rendering her bedridden. Initially prescribed a muscle relaxant, which she could not tolerate, she was then given prednisone, providing minimal relief. She supplemented with Tylenol, taking up to six tablets daily. Despite these interventions, her pain persisted, leading to another doctor's visit where she received a Toradol injection in her hip, providing temporary relief for six hours. She experienced excruciating pain radiating down the lateral aspect of her right leg, rated 10/10, with numbness in her foot and heel, and an inability to lift her leg.  She completed her course of prednisone.  We suspected disc herniation ordered MRI of the lumbar spine.      Since her last visit, her symptoms worsened with weakness and bilateral legs right still predominant, leading her to seek emergency care at [facility]. She was referred to the orthopedic department and evaluated by Dr. Wills, Neurosurgery is scheduled for a right L5-S1 discectomy on 07/17/2025.  Denies bladder or bowel incontinence.    She did want to return to review the MRI results with us and seek a second opinion as she has seen Dr. Lagos in the past.               6/3/2025    12:09 PM   AMB PAIN ASSESSMENT   Location of Pain Back   Location Modifiers Medial   Severity of Pain 10   Quality of Pain Aching   Duration of Pain Persistent   Frequency of Pain Constant   Date Pain First Started 5/13/2025   Aggravating Factors Walking;Standing   Limiting Behavior Some   Relieving Factors Other (Comment)    Result of Injury No   Work-Related Injury No   Are there other pain locations you wish to document? No       Significant value

## 2025-06-26 ENCOUNTER — TELEPHONE (OUTPATIENT)
Age: 70
End: 2025-06-26

## 2025-06-26 NOTE — TELEPHONE ENCOUNTER
Discussed with patient releasing surgery date of 7/18. She was able to get her surgery moved up with whom she originally had surgery scheduled with.

## 2025-06-30 RX ORDER — GABAPENTIN 100 MG/1
100 CAPSULE ORAL 3 TIMES DAILY
Qty: 90 CAPSULE | Refills: 0 | OUTPATIENT
Start: 2025-06-30 | End: 2025-07-30

## 2025-06-30 RX ORDER — CELECOXIB 200 MG/1
CAPSULE ORAL DAILY
Qty: 30 CAPSULE | Refills: 0 | OUTPATIENT
Start: 2025-06-30

## 2025-08-13 ENCOUNTER — OFFICE VISIT (OUTPATIENT)
Age: 70
End: 2025-08-13
Payer: MEDICARE

## 2025-08-13 VITALS
DIASTOLIC BLOOD PRESSURE: 60 MMHG | HEART RATE: 80 BPM | HEIGHT: 64 IN | WEIGHT: 196 LBS | SYSTOLIC BLOOD PRESSURE: 106 MMHG | BODY MASS INDEX: 33.46 KG/M2

## 2025-08-13 DIAGNOSIS — I25.10 ATHEROSCLEROSIS OF NATIVE CORONARY ARTERY OF NATIVE HEART WITHOUT ANGINA PECTORIS: ICD-10-CM

## 2025-08-13 DIAGNOSIS — I10 ESSENTIAL HYPERTENSION: ICD-10-CM

## 2025-08-13 DIAGNOSIS — E78.2 MIXED HYPERLIPIDEMIA: Primary | ICD-10-CM

## 2025-08-13 DIAGNOSIS — Z78.9 STATIN INTOLERANCE: ICD-10-CM

## 2025-08-13 PROCEDURE — 3074F SYST BP LT 130 MM HG: CPT | Performed by: INTERNAL MEDICINE

## 2025-08-13 PROCEDURE — G8427 DOCREV CUR MEDS BY ELIG CLIN: HCPCS | Performed by: INTERNAL MEDICINE

## 2025-08-13 PROCEDURE — 1123F ACP DISCUSS/DSCN MKR DOCD: CPT | Performed by: INTERNAL MEDICINE

## 2025-08-13 PROCEDURE — G8417 CALC BMI ABV UP PARAM F/U: HCPCS | Performed by: INTERNAL MEDICINE

## 2025-08-13 PROCEDURE — 1159F MED LIST DOCD IN RCRD: CPT | Performed by: INTERNAL MEDICINE

## 2025-08-13 PROCEDURE — 3078F DIAST BP <80 MM HG: CPT | Performed by: INTERNAL MEDICINE

## 2025-08-13 PROCEDURE — G8400 PT W/DXA NO RESULTS DOC: HCPCS | Performed by: INTERNAL MEDICINE

## 2025-08-13 PROCEDURE — 1090F PRES/ABSN URINE INCON ASSESS: CPT | Performed by: INTERNAL MEDICINE

## 2025-08-13 PROCEDURE — 3017F COLORECTAL CA SCREEN DOC REV: CPT | Performed by: INTERNAL MEDICINE

## 2025-08-13 PROCEDURE — 99214 OFFICE O/P EST MOD 30 MIN: CPT | Performed by: INTERNAL MEDICINE

## 2025-08-13 PROCEDURE — 1036F TOBACCO NON-USER: CPT | Performed by: INTERNAL MEDICINE

## 2025-08-13 RX ORDER — HYDROCHLOROTHIAZIDE 25 MG/1
25 TABLET ORAL DAILY
Qty: 90 TABLET | Refills: 3 | Status: SHIPPED | OUTPATIENT
Start: 2025-08-13

## 2025-08-13 RX ORDER — HYDROCODONE BITARTRATE AND ACETAMINOPHEN 7.5; 325 MG/1; MG/1
1 TABLET ORAL EVERY 6 HOURS PRN
COMMUNITY

## 2025-08-13 RX ORDER — LISINOPRIL 20 MG/1
20 TABLET ORAL 2 TIMES DAILY
Qty: 180 TABLET | Refills: 1 | Status: SHIPPED | OUTPATIENT
Start: 2025-08-13